# Patient Record
Sex: FEMALE | Race: BLACK OR AFRICAN AMERICAN | NOT HISPANIC OR LATINO | URBAN - METROPOLITAN AREA
[De-identification: names, ages, dates, MRNs, and addresses within clinical notes are randomized per-mention and may not be internally consistent; named-entity substitution may affect disease eponyms.]

---

## 2017-08-07 ENCOUNTER — EMERGENCY (EMERGENCY)
Facility: HOSPITAL | Age: 47
LOS: 0 days | Discharge: HOME | End: 2017-08-07

## 2017-08-07 DIAGNOSIS — Z90.710 ACQUIRED ABSENCE OF BOTH CERVIX AND UTERUS: ICD-10-CM

## 2017-08-07 DIAGNOSIS — M54.5 LOW BACK PAIN: ICD-10-CM

## 2017-12-30 ENCOUNTER — EMERGENCY (EMERGENCY)
Facility: HOSPITAL | Age: 47
LOS: 0 days | Discharge: HOME | End: 2017-12-30

## 2017-12-30 DIAGNOSIS — R05 COUGH: ICD-10-CM

## 2017-12-30 DIAGNOSIS — R50.9 FEVER, UNSPECIFIED: ICD-10-CM

## 2017-12-30 DIAGNOSIS — Z90.710 ACQUIRED ABSENCE OF BOTH CERVIX AND UTERUS: ICD-10-CM

## 2018-01-19 ENCOUNTER — EMERGENCY (EMERGENCY)
Facility: HOSPITAL | Age: 48
LOS: 0 days | Discharge: HOME | End: 2018-01-19

## 2018-01-19 DIAGNOSIS — R00.2 PALPITATIONS: ICD-10-CM

## 2018-01-19 DIAGNOSIS — R42 DIZZINESS AND GIDDINESS: ICD-10-CM

## 2018-01-19 DIAGNOSIS — Z90.710 ACQUIRED ABSENCE OF BOTH CERVIX AND UTERUS: ICD-10-CM

## 2018-11-18 ENCOUNTER — EMERGENCY (EMERGENCY)
Facility: HOSPITAL | Age: 48
LOS: 0 days | Discharge: HOME | End: 2018-11-18
Admitting: PHYSICIAN ASSISTANT

## 2018-11-18 VITALS
RESPIRATION RATE: 18 BRPM | OXYGEN SATURATION: 99 % | TEMPERATURE: 97 F | HEART RATE: 82 BPM | DIASTOLIC BLOOD PRESSURE: 73 MMHG | SYSTOLIC BLOOD PRESSURE: 138 MMHG

## 2018-11-18 DIAGNOSIS — R05 COUGH: ICD-10-CM

## 2018-11-18 DIAGNOSIS — J40 BRONCHITIS, NOT SPECIFIED AS ACUTE OR CHRONIC: ICD-10-CM

## 2018-11-18 DIAGNOSIS — J02.9 ACUTE PHARYNGITIS, UNSPECIFIED: ICD-10-CM

## 2018-11-18 RX ORDER — AZITHROMYCIN 500 MG/1
1 TABLET, FILM COATED ORAL
Qty: 1 | Refills: 0
Start: 2018-11-18 | End: 2018-11-22

## 2018-11-18 RX ORDER — FLUTICASONE PROPIONATE 50 MCG
1 SPRAY, SUSPENSION NASAL
Qty: 1 | Refills: 0
Start: 2018-11-18 | End: 2018-11-24

## 2018-11-18 NOTE — ED PROVIDER NOTE - OBJECTIVE STATEMENT
49 y/o female presents to the ED c/o "I have a bad cough with congestion, fever, chills, sore throat and runny nose for a couple days." no CP/ SOB

## 2021-05-14 ENCOUNTER — NON-APPOINTMENT (OUTPATIENT)
Age: 51
End: 2021-05-14

## 2021-05-14 PROBLEM — Z00.00 ENCOUNTER FOR PREVENTIVE HEALTH EXAMINATION: Status: ACTIVE | Noted: 2021-05-14

## 2021-05-27 ENCOUNTER — APPOINTMENT (OUTPATIENT)
Dept: BREAST CENTER | Facility: CLINIC | Age: 51
End: 2021-05-27
Payer: COMMERCIAL

## 2021-05-27 VITALS
SYSTOLIC BLOOD PRESSURE: 122 MMHG | HEIGHT: 65 IN | BODY MASS INDEX: 24.49 KG/M2 | TEMPERATURE: 96.6 F | WEIGHT: 147 LBS | DIASTOLIC BLOOD PRESSURE: 87 MMHG

## 2021-05-27 DIAGNOSIS — Z78.9 OTHER SPECIFIED HEALTH STATUS: ICD-10-CM

## 2021-05-27 PROCEDURE — 99205 OFFICE O/P NEW HI 60 MIN: CPT

## 2021-05-27 PROCEDURE — 99072 ADDL SUPL MATRL&STAF TM PHE: CPT

## 2021-05-27 NOTE — REASON FOR VISIT
[Initial Evaluation] : an initial evaluation [FreeTextEntry1] : Patient had stereotactic guided right breast biopsy on 5/10/21 that resulted as right breast DCIS

## 2021-05-27 NOTE — ASSESSMENT
[FreeTextEntry1] : Patient is 50 year old female with newly diagnosed DCIS of the right breast, here to discuss her treatment options. \par \par Patient is s/p RIGHT breast stereotactic core biopsy on 5/10/21\par Right breast 12:00 with calcifications, sereotactic core biopsy:(buckle shaped metallic clip placed at the biopsy site)\par -Ductal carcinoma in situ (DCIS), intermediate to high grade, solid type, with focal necrosis and calcifications.\par ER- POSITIVE 71-80%\par IA -NEGATIVE <1%\par \par On physical exam, there are no discrete masses in either breast or axilla.  There is no nipple discharge or inversion bilaterally.  There are no skin changes bilaterally.  \par \par We had a lengthy discussion regarding her diagnosis and treatment options.  Her pathology results were reviewed.  The patient will need a bilateral breast MRI wwo contrast performed preoperatively.  This is for evaluation of extent of disease in the affected breast and to rule out disease in the contralateral breast.\par \par We discussed that there is no difference in survival rate between lumpectomy with radiation therapy versus a mastectomy.  However, the rate of local recurrence is slightly higher with lumpectomy.  The rate of recurrence with mastectomy is not zero, and is likely closer to 4-9%.  At this time, she is leaning towards undergoing breast conservation therapy with lumpectomy.  Since this is not readily palpable, it will need to be localized preoperatively with a wire placement on the day of her surgery.  We discussed that with lumpectomy for noninvasive cancer, she will not need a sentinel lymph node biopsy unless final pathology reveals invasive disease.   She agrees to right breast lumpectomy with needle localization.   \par \par The benefits and risks of the lumpectomy procedure were explained to the patient, including but not limited to bleeding, infection, seroma and/or hematoma formation, pain, numbness of skin, scarring, and possible re-operation if the surgical excision demonstrates positive margins.  Informed consent was obtained today.  She is aware that she will meet with the pre-surgical department here at the hospital for pre-surgery testing.  She is also aware that she will likely need to meet with her primary care physician, and/or other medical specialists to obtain clearance for surgery, and to contact them appropriately.  \par \par Since the patient prefers lumpectomy, she will most likely need adjuvant radiation therapy.  The indication for radiation therapy and side effects were discussed.  Radiation therapy options consist of whole breast radiation, 5-day partial breast radiation therapy via the DANN catheter. Common side effects were reviewed.  She will be referred to radiation therapy to discuss her options.  \par \par With regard to systemic therapy, chemotherapy would not be indicated for the treatment of DCIS.  Hormonal therapy with an aromatase inhibitor or Tamoxifen, may be advised if the disease is estrogen receptor positive.  Not only can it help prevent breast cancer recurrence, it can help reduce the risk of developing a new primary tumor.  \par \par PLAN:\par 1. Bilateral Breast MRI wwo contrast.\par 2. Lumpectomy of right breast with needle localization.\par 3. Patient is a candidate for DANN; Referral to radiation oncology will be made post operatively.\par 4. Patient will be referred to Medical Oncology post operatively.\par \par I spent a total of 60 minutes of face to face time with this patient, greater than 50% of which was spent in counseling and/or coordination of care. All of her questions were appropriately answered\par \par \par \par

## 2021-05-27 NOTE — PAST MEDICAL HISTORY
[Surgical Menopause] : The patient is in surgical menopause [Total Preg ___] : G[unfilled] [Live Births ___] : P[unfilled]  [Age At Live Birth ___] : Age at live birth: [unfilled] [FreeTextEntry5] : partial hysterectomy 2016 [FreeTextEntry7] : yes, stopped in 1997 [FreeTextEntry8] : yes for 6 months

## 2021-05-27 NOTE — HISTORY OF PRESENT ILLNESS
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 Patient had left breast biopsy 2009 which was benign. She denies any palpable masses, skin changes, or nipple discharge. \par \par Mammogram on 4/16/21:\par IMPRESSION: Indeterminate calcifications in the right breast. Stereotactically guided core biopsy is advised for further evaluation.\par Biopsy of the right breast is recommended.\par BI-RAD 4: SUSPICIOUS\par \par \par RIGHT breast stereotactic core biopsy on 5/10/21\par Right breast 12:00 with calcifications, sereotactic core biopsy: (buckle shaped metallic clip placed at the biopsy site)\par -Ductal carcinoma in situ (DCIS), intermediate to high grade, solid type, with focal necrosis and calcifications.\par ER- POSITIVE 71-80%\par NH -NEGATIVE <1%\par \par No family hx of breast cancer or ovarian cancer.

## 2021-05-27 NOTE — DATA REVIEWED
[FreeTextEntry1] : \par Mammogram on 4/16/21:\par IMPRESSION: Indeterminate calcifications in the right breast. Stereotactically guided core biopsy is advised for further evaluation.\par Biopsy of the right breast is recommended.\par BI-RAD 4: SUSPICIOUS\par RIGHT breast stereotactic core biopsy on 5/10/21\par \par Right breast 12:00 with calcifications, sereotactic core biopsy:\par -Ductal carcinoma in situ (DCIS), intermediate to high grade, solid type, with focal necrosis and calcifications.\par  \par

## 2021-05-27 NOTE — PHYSICAL EXAM
[Normocephalic] : normocephalic [Atraumatic] : atraumatic [EOMI] : extra ocular movement intact [Supple] : supple [Clear to Auscultation Bilat] : clear to auscultation bilaterally [Normal Sinus Rhythm] : normal sinus rhythm [Examined in the supine and seated position] : examined in the supine and seated position [Symmetrical] : symmetrical [No dominant masses] : no dominant masses in right breast  [No dominant masses] : no dominant masses left breast [No Nipple Retraction] : no left nipple retraction [No Nipple Discharge] : no left nipple discharge [No Axillary Lymphadenopathy] : no left axillary lymphadenopathy [Soft] : abdomen soft [No Edema] : no edema [No Swelling] : no swelling [No Rashes] : no rashes [No Ulceration] : no ulceration

## 2021-05-28 ENCOUNTER — NON-APPOINTMENT (OUTPATIENT)
Age: 51
End: 2021-05-28

## 2021-06-04 ENCOUNTER — LABORATORY RESULT (OUTPATIENT)
Age: 51
End: 2021-06-04

## 2021-06-09 ENCOUNTER — OUTPATIENT (OUTPATIENT)
Dept: OUTPATIENT SERVICES | Facility: HOSPITAL | Age: 51
LOS: 1 days | Discharge: HOME | End: 2021-06-09

## 2021-06-09 DIAGNOSIS — Z02.9 ENCOUNTER FOR ADMINISTRATIVE EXAMINATIONS, UNSPECIFIED: ICD-10-CM

## 2021-06-10 ENCOUNTER — NON-APPOINTMENT (OUTPATIENT)
Age: 51
End: 2021-06-10

## 2021-06-11 ENCOUNTER — RESULT REVIEW (OUTPATIENT)
Age: 51
End: 2021-06-11

## 2021-06-11 ENCOUNTER — NON-APPOINTMENT (OUTPATIENT)
Age: 51
End: 2021-06-11

## 2021-06-11 ENCOUNTER — OUTPATIENT (OUTPATIENT)
Dept: OUTPATIENT SERVICES | Facility: HOSPITAL | Age: 51
LOS: 1 days | Discharge: HOME | End: 2021-06-11
Payer: COMMERCIAL

## 2021-06-11 DIAGNOSIS — R92.8 OTHER ABNORMAL AND INCONCLUSIVE FINDINGS ON DIAGNOSTIC IMAGING OF BREAST: ICD-10-CM

## 2021-06-11 PROCEDURE — G0279: CPT | Mod: 26

## 2021-06-11 PROCEDURE — 77066 DX MAMMO INCL CAD BI: CPT | Mod: 26

## 2021-06-15 ENCOUNTER — RESULT REVIEW (OUTPATIENT)
Age: 51
End: 2021-06-15

## 2021-06-15 ENCOUNTER — NON-APPOINTMENT (OUTPATIENT)
Age: 51
End: 2021-06-15

## 2021-06-15 ENCOUNTER — OUTPATIENT (OUTPATIENT)
Dept: OUTPATIENT SERVICES | Facility: HOSPITAL | Age: 51
LOS: 1 days | Discharge: HOME | End: 2021-06-15
Payer: COMMERCIAL

## 2021-06-15 VITALS
HEART RATE: 74 BPM | RESPIRATION RATE: 18 BRPM | OXYGEN SATURATION: 99 % | DIASTOLIC BLOOD PRESSURE: 82 MMHG | SYSTOLIC BLOOD PRESSURE: 161 MMHG | HEIGHT: 65.5 IN | TEMPERATURE: 96 F | WEIGHT: 145.06 LBS

## 2021-06-15 DIAGNOSIS — Z01.818 ENCOUNTER FOR OTHER PREPROCEDURAL EXAMINATION: ICD-10-CM

## 2021-06-15 DIAGNOSIS — Z90.711 ACQUIRED ABSENCE OF UTERUS WITH REMAINING CERVICAL STUMP: Chronic | ICD-10-CM

## 2021-06-15 DIAGNOSIS — D05.10 INTRADUCTAL CARCINOMA IN SITU OF UNSPECIFIED BREAST: ICD-10-CM

## 2021-06-15 LAB
ALBUMIN SERPL ELPH-MCNC: 4.5 G/DL — SIGNIFICANT CHANGE UP (ref 3.5–5.2)
ALP SERPL-CCNC: 80 U/L — SIGNIFICANT CHANGE UP (ref 30–115)
ALT FLD-CCNC: 20 U/L — SIGNIFICANT CHANGE UP (ref 0–41)
ANION GAP SERPL CALC-SCNC: 8 MMOL/L — SIGNIFICANT CHANGE UP (ref 7–14)
APTT BLD: 30.8 SEC — SIGNIFICANT CHANGE UP (ref 27–39.2)
AST SERPL-CCNC: 21 U/L — SIGNIFICANT CHANGE UP (ref 0–41)
BASOPHILS # BLD AUTO: 0.03 K/UL — SIGNIFICANT CHANGE UP (ref 0–0.2)
BASOPHILS NFR BLD AUTO: 0.5 % — SIGNIFICANT CHANGE UP (ref 0–1)
BILIRUB SERPL-MCNC: <0.2 MG/DL — SIGNIFICANT CHANGE UP (ref 0.2–1.2)
BUN SERPL-MCNC: 14 MG/DL — SIGNIFICANT CHANGE UP (ref 10–20)
CALCIUM SERPL-MCNC: 9.6 MG/DL — SIGNIFICANT CHANGE UP (ref 8.5–10.1)
CHLORIDE SERPL-SCNC: 102 MMOL/L — SIGNIFICANT CHANGE UP (ref 98–110)
CO2 SERPL-SCNC: 27 MMOL/L — SIGNIFICANT CHANGE UP (ref 17–32)
CREAT SERPL-MCNC: 0.8 MG/DL — SIGNIFICANT CHANGE UP (ref 0.7–1.5)
EOSINOPHIL # BLD AUTO: 0.18 K/UL — SIGNIFICANT CHANGE UP (ref 0–0.7)
EOSINOPHIL NFR BLD AUTO: 3.1 % — SIGNIFICANT CHANGE UP (ref 0–8)
GLUCOSE SERPL-MCNC: 85 MG/DL — SIGNIFICANT CHANGE UP (ref 70–99)
HCT VFR BLD CALC: 39.2 % — SIGNIFICANT CHANGE UP (ref 37–47)
HGB BLD-MCNC: 12.1 G/DL — SIGNIFICANT CHANGE UP (ref 12–16)
IMM GRANULOCYTES NFR BLD AUTO: 0 % — LOW (ref 0.1–0.3)
INR BLD: 1 RATIO — SIGNIFICANT CHANGE UP (ref 0.65–1.3)
LYMPHOCYTES # BLD AUTO: 2.49 K/UL — SIGNIFICANT CHANGE UP (ref 1.2–3.4)
LYMPHOCYTES # BLD AUTO: 42.8 % — SIGNIFICANT CHANGE UP (ref 20.5–51.1)
MCHC RBC-ENTMCNC: 25.2 PG — LOW (ref 27–31)
MCHC RBC-ENTMCNC: 30.9 G/DL — LOW (ref 32–37)
MCV RBC AUTO: 81.7 FL — SIGNIFICANT CHANGE UP (ref 81–99)
MONOCYTES # BLD AUTO: 0.41 K/UL — SIGNIFICANT CHANGE UP (ref 0.1–0.6)
MONOCYTES NFR BLD AUTO: 7 % — SIGNIFICANT CHANGE UP (ref 1.7–9.3)
NEUTROPHILS # BLD AUTO: 2.71 K/UL — SIGNIFICANT CHANGE UP (ref 1.4–6.5)
NEUTROPHILS NFR BLD AUTO: 46.6 % — SIGNIFICANT CHANGE UP (ref 42.2–75.2)
NRBC # BLD: 0 /100 WBCS — SIGNIFICANT CHANGE UP (ref 0–0)
PLATELET # BLD AUTO: 245 K/UL — SIGNIFICANT CHANGE UP (ref 130–400)
POTASSIUM SERPL-MCNC: 4.3 MMOL/L — SIGNIFICANT CHANGE UP (ref 3.5–5)
POTASSIUM SERPL-SCNC: 4.3 MMOL/L — SIGNIFICANT CHANGE UP (ref 3.5–5)
PROT SERPL-MCNC: 7.2 G/DL — SIGNIFICANT CHANGE UP (ref 6–8)
PROTHROM AB SERPL-ACNC: 11.5 SEC — SIGNIFICANT CHANGE UP (ref 9.95–12.87)
RBC # BLD: 4.8 M/UL — SIGNIFICANT CHANGE UP (ref 4.2–5.4)
RBC # FLD: 13 % — SIGNIFICANT CHANGE UP (ref 11.5–14.5)
SODIUM SERPL-SCNC: 137 MMOL/L — SIGNIFICANT CHANGE UP (ref 135–146)
WBC # BLD: 5.82 K/UL — SIGNIFICANT CHANGE UP (ref 4.8–10.8)
WBC # FLD AUTO: 5.82 K/UL — SIGNIFICANT CHANGE UP (ref 4.8–10.8)

## 2021-06-15 PROCEDURE — 93010 ELECTROCARDIOGRAM REPORT: CPT

## 2021-06-15 PROCEDURE — 71046 X-RAY EXAM CHEST 2 VIEWS: CPT | Mod: 26

## 2021-06-15 NOTE — H&P PST ADULT - NSANTHOSAYNRD_GEN_A_CORE
No. ERASTO screening performed.  STOP BANG Legend: 0-2 = LOW Risk; 3-4 = INTERMEDIATE Risk; 5-8 = HIGH Risk

## 2021-06-15 NOTE — H&P PST ADULT - NSICDXPASTMEDICALHX_GEN_ALL_CORE_FT
PAST MEDICAL HISTORY:  DCIS (ductal carcinoma in situ) right    No pertinent past medical history

## 2021-06-15 NOTE — H&P PST ADULT - HISTORY OF PRESENT ILLNESS
pt with abnormal mammo 3/2021 followed by ultrasound and biopsy   now for planned procedure       PATIENT CURRENTLY DENIES CHEST PAIN  SHORTNESS OF BREATH  PALPITATIONS,  DYSURIA, OR UPPER RESPIRATORY INFECTION IN PAST 2 WEEKS  EXERCISE  TOLERANCE  1-2 FLIGHT OF STAIRS  WITHOUT SHORTNESS OF BREATH  denies travel outside the [place] in the past 30 days  + 2 doses  moderna  Patient denies any signs or symptoms of COVID 19 and denies contact with known positive individuals.  They have an appointment for repeat COVID testing pre-procedure and acknowledge its time and place.  They were instructed to quarantine pre-procedure, practice exposure control measures, continue to self-monitor and report any concerns to their proceduralist.  pt advised self quarantine till day of procedure  Anesthesia Alert  NO--Difficult Airway  NO--History of neck surgery or radiation  NO--Limited ROM of neck  NO--History of Malignant hyperthermia  NO--No personal or family history of Pseudocholinesterase deficiency.  NO--Prior Anesthesia Complication  NO--Latex Allergy  NO--Loose teeth  NO--History of Rheumatoid Arthritis  NO--Bleeding risk  NO--ERASTO  NO--Other_____    PT DENIES ANY RASHES, ABRASION, OR OPEN WOUNDS OR CUTS    AS PER THE PT, THIS IS HIS/HER COMPLETE MEDICAL AND SURGICAL HX, INCLUDING MEDICATIONS PRESCRIBED AND OVER THE COUNTER    Patient verbalized understanding of instructions and was given the opportunity to ask questions and have them answered.      D05.10/58819    Ductal carcinoma in situ (DCIS) of breast    Encounter for other preprocedural examination    ^D05.10/78474    Ductal carcinoma in situ (DCIS) of breast    Encounter for other preprocedural examination    No pertinent past medical history

## 2021-06-15 NOTE — H&P PST ADULT - REASON FOR ADMISSION
Patient is a  51 year old  female presenting to PAST in preparation for  right breast lumpectomy with needle localization  on   7/2/2021 under [facility] anesthesia by Dr. santana yes

## 2021-06-17 ENCOUNTER — RESULT REVIEW (OUTPATIENT)
Age: 51
End: 2021-06-17

## 2021-06-17 ENCOUNTER — OUTPATIENT (OUTPATIENT)
Dept: OUTPATIENT SERVICES | Facility: HOSPITAL | Age: 51
LOS: 1 days | Discharge: HOME | End: 2021-06-17
Payer: COMMERCIAL

## 2021-06-17 DIAGNOSIS — D05.10 INTRADUCTAL CARCINOMA IN SITU OF UNSPECIFIED BREAST: ICD-10-CM

## 2021-06-17 DIAGNOSIS — Z90.711 ACQUIRED ABSENCE OF UTERUS WITH REMAINING CERVICAL STUMP: Chronic | ICD-10-CM

## 2021-06-17 PROCEDURE — 77049 MRI BREAST C-+ W/CAD BI: CPT | Mod: 26

## 2021-06-18 ENCOUNTER — NON-APPOINTMENT (OUTPATIENT)
Age: 51
End: 2021-06-18

## 2021-06-21 ENCOUNTER — RESULT REVIEW (OUTPATIENT)
Age: 51
End: 2021-06-21

## 2021-06-22 ENCOUNTER — OUTPATIENT (OUTPATIENT)
Dept: OUTPATIENT SERVICES | Facility: HOSPITAL | Age: 51
LOS: 1 days | Discharge: HOME | End: 2021-06-22
Payer: COMMERCIAL

## 2021-06-22 ENCOUNTER — RESULT REVIEW (OUTPATIENT)
Age: 51
End: 2021-06-22

## 2021-06-22 DIAGNOSIS — Z90.711 ACQUIRED ABSENCE OF UTERUS WITH REMAINING CERVICAL STUMP: Chronic | ICD-10-CM

## 2021-06-22 PROCEDURE — 19081 BX BREAST 1ST LESION STRTCTC: CPT | Mod: RT

## 2021-06-22 PROCEDURE — 88305 TISSUE EXAM BY PATHOLOGIST: CPT | Mod: 26

## 2021-06-22 PROCEDURE — 19082 BX BREAST ADD LESION STRTCTC: CPT | Mod: LT

## 2021-06-22 PROCEDURE — 76098 X-RAY EXAM SURGICAL SPECIMEN: CPT | Mod: 26

## 2021-06-23 LAB — SURGICAL PATHOLOGY STUDY: SIGNIFICANT CHANGE UP

## 2021-06-24 ENCOUNTER — NON-APPOINTMENT (OUTPATIENT)
Age: 51
End: 2021-06-24

## 2021-06-25 ENCOUNTER — NON-APPOINTMENT (OUTPATIENT)
Age: 51
End: 2021-06-25

## 2021-06-25 DIAGNOSIS — R92.1 MAMMOGRAPHIC CALCIFICATION FOUND ON DIAGNOSTIC IMAGING OF BREAST: ICD-10-CM

## 2021-06-25 DIAGNOSIS — N60.82 OTHER BENIGN MAMMARY DYSPLASIAS OF LEFT BREAST: ICD-10-CM

## 2021-06-25 DIAGNOSIS — D24.1 BENIGN NEOPLASM OF RIGHT BREAST: ICD-10-CM

## 2021-06-30 ENCOUNTER — RESULT REVIEW (OUTPATIENT)
Age: 51
End: 2021-06-30

## 2021-06-30 ENCOUNTER — OUTPATIENT (OUTPATIENT)
Dept: OUTPATIENT SERVICES | Facility: HOSPITAL | Age: 51
LOS: 1 days | Discharge: HOME | End: 2021-06-30
Payer: COMMERCIAL

## 2021-06-30 DIAGNOSIS — R92.8 OTHER ABNORMAL AND INCONCLUSIVE FINDINGS ON DIAGNOSTIC IMAGING OF BREAST: ICD-10-CM

## 2021-06-30 DIAGNOSIS — Z90.711 ACQUIRED ABSENCE OF UTERUS WITH REMAINING CERVICAL STUMP: Chronic | ICD-10-CM

## 2021-06-30 PROCEDURE — 77066 DX MAMMO INCL CAD BI: CPT | Mod: 26

## 2021-06-30 PROCEDURE — 88305 TISSUE EXAM BY PATHOLOGIST: CPT | Mod: 26

## 2021-06-30 PROCEDURE — 19085 BX BREAST 1ST LESION MR IMAG: CPT | Mod: LT

## 2021-06-30 PROCEDURE — 19085 BX BREAST 1ST LESION MR IMAG: CPT | Mod: RT

## 2021-07-01 LAB — SURGICAL PATHOLOGY STUDY: SIGNIFICANT CHANGE UP

## 2021-07-02 ENCOUNTER — NON-APPOINTMENT (OUTPATIENT)
Age: 51
End: 2021-07-02

## 2021-07-06 ENCOUNTER — NON-APPOINTMENT (OUTPATIENT)
Age: 51
End: 2021-07-06

## 2021-07-11 ENCOUNTER — LABORATORY RESULT (OUTPATIENT)
Age: 51
End: 2021-07-11

## 2021-07-11 ENCOUNTER — OUTPATIENT (OUTPATIENT)
Dept: OUTPATIENT SERVICES | Facility: HOSPITAL | Age: 51
LOS: 1 days | Discharge: HOME | End: 2021-07-11

## 2021-07-11 DIAGNOSIS — Z11.59 ENCOUNTER FOR SCREENING FOR OTHER VIRAL DISEASES: ICD-10-CM

## 2021-07-11 DIAGNOSIS — Z90.711 ACQUIRED ABSENCE OF UTERUS WITH REMAINING CERVICAL STUMP: Chronic | ICD-10-CM

## 2021-07-12 ENCOUNTER — NON-APPOINTMENT (OUTPATIENT)
Age: 51
End: 2021-07-12

## 2021-07-14 ENCOUNTER — RESULT REVIEW (OUTPATIENT)
Age: 51
End: 2021-07-14

## 2021-07-14 ENCOUNTER — OUTPATIENT (OUTPATIENT)
Dept: OUTPATIENT SERVICES | Facility: HOSPITAL | Age: 51
LOS: 1 days | Discharge: HOME | End: 2021-07-14
Payer: COMMERCIAL

## 2021-07-14 ENCOUNTER — APPOINTMENT (OUTPATIENT)
Dept: BREAST CENTER | Facility: AMBULATORY SURGERY CENTER | Age: 51
End: 2021-07-14
Payer: COMMERCIAL

## 2021-07-14 VITALS
DIASTOLIC BLOOD PRESSURE: 76 MMHG | SYSTOLIC BLOOD PRESSURE: 143 MMHG | RESPIRATION RATE: 18 BRPM | TEMPERATURE: 98 F | HEIGHT: 65.5 IN | WEIGHT: 145.06 LBS | HEART RATE: 78 BPM | OXYGEN SATURATION: 100 %

## 2021-07-14 VITALS
RESPIRATION RATE: 15 BRPM | HEART RATE: 67 BPM | SYSTOLIC BLOOD PRESSURE: 148 MMHG | OXYGEN SATURATION: 100 % | DIASTOLIC BLOOD PRESSURE: 70 MMHG

## 2021-07-14 DIAGNOSIS — Z17.0 ESTROGEN RECEPTOR POSITIVE STATUS [ER+]: ICD-10-CM

## 2021-07-14 DIAGNOSIS — Z90.710 ACQUIRED ABSENCE OF BOTH CERVIX AND UTERUS: ICD-10-CM

## 2021-07-14 DIAGNOSIS — N60.01 SOLITARY CYST OF RIGHT BREAST: ICD-10-CM

## 2021-07-14 DIAGNOSIS — N64.89 OTHER SPECIFIED DISORDERS OF BREAST: ICD-10-CM

## 2021-07-14 DIAGNOSIS — N60.21 FIBROADENOSIS OF RIGHT BREAST: ICD-10-CM

## 2021-07-14 DIAGNOSIS — D05.11 INTRADUCTAL CARCINOMA IN SITU OF RIGHT BREAST: ICD-10-CM

## 2021-07-14 DIAGNOSIS — L90.5 SCAR CONDITIONS AND FIBROSIS OF SKIN: ICD-10-CM

## 2021-07-14 DIAGNOSIS — N60.81 OTHER BENIGN MAMMARY DYSPLASIAS OF RIGHT BREAST: ICD-10-CM

## 2021-07-14 DIAGNOSIS — N60.31 FIBROSCLEROSIS OF RIGHT BREAST: ICD-10-CM

## 2021-07-14 DIAGNOSIS — Z90.711 ACQUIRED ABSENCE OF UTERUS WITH REMAINING CERVICAL STUMP: Chronic | ICD-10-CM

## 2021-07-14 PROCEDURE — 19281 PERQ DEVICE BREAST 1ST IMAG: CPT | Mod: RT

## 2021-07-14 PROCEDURE — 88305 TISSUE EXAM BY PATHOLOGIST: CPT | Mod: 26

## 2021-07-14 PROCEDURE — 19301 PARTIAL MASTECTOMY: CPT | Mod: RT

## 2021-07-14 PROCEDURE — 88304 TISSUE EXAM BY PATHOLOGIST: CPT | Mod: 26

## 2021-07-14 RX ORDER — ONDANSETRON 8 MG/1
4 TABLET, FILM COATED ORAL ONCE
Refills: 0 | Status: DISCONTINUED | OUTPATIENT
Start: 2021-07-14 | End: 2021-07-28

## 2021-07-14 RX ORDER — SODIUM CHLORIDE 9 MG/ML
1000 INJECTION, SOLUTION INTRAVENOUS
Refills: 0 | Status: DISCONTINUED | OUTPATIENT
Start: 2021-07-14 | End: 2021-07-28

## 2021-07-14 RX ORDER — OXYCODONE AND ACETAMINOPHEN 5; 325 MG/1; MG/1
1 TABLET ORAL EVERY 4 HOURS
Refills: 0 | Status: DISCONTINUED | OUTPATIENT
Start: 2021-07-14 | End: 2021-07-14

## 2021-07-14 RX ORDER — HYDROMORPHONE HYDROCHLORIDE 2 MG/ML
0.5 INJECTION INTRAMUSCULAR; INTRAVENOUS; SUBCUTANEOUS
Refills: 0 | Status: DISCONTINUED | OUTPATIENT
Start: 2021-07-14 | End: 2021-07-14

## 2021-07-14 RX ADMIN — SODIUM CHLORIDE 100 MILLILITER(S): 9 INJECTION, SOLUTION INTRAVENOUS at 11:35

## 2021-07-14 NOTE — ASU DISCHARGE PLAN (ADULT/PEDIATRIC) - CARE PROVIDER_API CALL
Rachel Barba)  Surgery  [address] [facility], [address]nd [place] [address]  Phone: [phone]  Fax: [phone]  Follow Up Time:

## 2021-07-14 NOTE — BRIEF OPERATIVE NOTE - NSICDXBRIEFPROCEDURE_GEN_ALL_CORE_FT
PROCEDURES:  Lumpectomy of right breast after needle localization 14-Jul-2021 11:22:44  Rachel Barba

## 2021-07-14 NOTE — CHART NOTE - NSCHARTNOTEFT_GEN_A_CORE
PACU ANESTHESIA ADMISSION NOTE      Procedure: Lumpectomy of right breast after needle localization      Post op diagnosis:      ____  Intubated  TV:______       Rate: ______      FiO2: ______    _x___  Patent Airway    _x___  Full return of protective reflexes    _x___  Full recovery from anesthesia / back to baseline status    Vitals:    See anesthesia record      Mental Status:  _x___ Awake   _____ Alert   _____ Drowsy   _____ Sedated    Nausea/Vomiting:  _x___  NO       ______Yes,   See Post - Op Orders         Pain Scale (0-10):  __0___    Treatment: _x___ None    ____ See Post - Op/PCA Orders    Post - Operative Fluids:   __x__ Oral   ____ See Post - Op Orders    Plan: Discharge:   _x___Home       _____Floor     _____Critical Care    _____  Other:_________________    Comments:  No anesthesia issues or complications noted.  Discharge when criteria met.

## 2021-07-21 LAB — SURGICAL PATHOLOGY STUDY: SIGNIFICANT CHANGE UP

## 2021-07-22 ENCOUNTER — APPOINTMENT (OUTPATIENT)
Dept: BREAST CENTER | Facility: CLINIC | Age: 51
End: 2021-07-22
Payer: COMMERCIAL

## 2021-07-22 VITALS
SYSTOLIC BLOOD PRESSURE: 122 MMHG | TEMPERATURE: 97.8 F | BODY MASS INDEX: 23.99 KG/M2 | WEIGHT: 144 LBS | DIASTOLIC BLOOD PRESSURE: 80 MMHG | HEIGHT: 65 IN

## 2021-07-22 PROCEDURE — 99024 POSTOP FOLLOW-UP VISIT: CPT

## 2021-07-22 NOTE — PHYSICAL EXAM
[Normocephalic] : normocephalic [EOMI] : extra ocular movement intact [Supple] : supple [Examined in the supine and seated position] : examined in the supine and seated position [No dominant masses] : no dominant masses in right breast  [No dominant masses] : no dominant masses left breast [No Nipple Retraction] : no left nipple retraction [No Nipple Discharge] : no left nipple discharge [No Axillary Lymphadenopathy] : no left axillary lymphadenopathy [Soft] : abdomen soft [No Edema] : no edema [No Swelling] : no swelling [No Rashes] : no rashes [No Ulceration] : no ulceration [de-identified] : right breast surgical incision site: No erythema, no discharge noted on inspection. No tenderness on palpation.

## 2021-07-22 NOTE — ASSESSMENT
[FreeTextEntry1] : Patient is status post Right NLOC ON 07/14/21\par She is feeling well\par She denies any fever/chills or erythema and / or drainage related to incision area. \par Her pain is well controlled, only complains of mild tenderness of the area.\par Her sutures were removed and pathology was discussed. \par \par PLAN: Right breast mass re-excision will be scheduled \par \par \par I spent a total of 15 minutes of face to face time with this patient, greater than 50% of which was spent in counseling and/or coordination of care. All of her questions were appropriately answered. \par \par

## 2021-07-22 NOTE — DATA REVIEWED
[FreeTextEntry1] :  \par  Pathology             Final\par Final Diagnosis\par 1.  Breast, right biopsy skin scar, excision/revision:\par -  Benign skin with dermal scar.  Negative for carcinoma.\par \par 2.  Breast, right superior 12:00 axis middle third mass, needle\par localized lumpectomy:\par -  Radial sclerosing lesion (radial scar) located adjacent to a\par healing prior biopsy site.\par -  Benign fibrofatty breast tissue with proliferative type\par fibrocystic changes including usual type duct hyperplasia,\par stromal fibrosis with foci of pseudoangiomatous stromal\par hyperplasia (PASH), sclerosing adenosis, and apocrine metaplasia-\par lined cysts.\par \par 3.  Breast, right inferior margin, excision:\par -  Focal ductal carcinoma in-situ (DCIS), solid type with single\par cell necrosis and cancerization of lobules, intermediate nuclear\par grade; focally abutting (<0.2 mm from, microscopic measurement)\par the nearest new inked margin.\par -  For hormone receptor protein expression status please see the\par pathology report from the prior needle core biopsy specimen (76-\par [id number]).\par -  AJCC 8th Edition Pathologic Stage:  pTis (DCIS), pNx, pMx.\par \par 4.  Breast, right posterior margin, excision:\par -  Healing prior biopsy site changes with focal ductal carcinoma\par in-situ (DCIS), solid type with single cell necrosis and\par cancerization of lobules, intermediate nuclear grade; located 2.0\par mm from the nearest new inked margin (microscopic measurement).\par \par 5.  Breast, right lateral margin, excision:\par -  Focal ductal carcinoma in-situ (DCIS), solid type with comedo\par necrosis and calcifications, intermediate nuclear\par grade; focally abutting (<0.2 mm from, microscopic measurement)\par the nearest new inked margin.\par \par Clinical History\par Right breast lumpectomy with needle localization\par \par \par \par \par \par VERONICA SANDOVAL                         \par \par \par \par Patient is a 51 year old female with Dx of DCIS\par \par Specimen(s) Submitted\par 1     Right breast biopsy scar\par Time obtained: 10:45 am\par 2     Right breast mass\par Time obtained: 10:49 am\par 3     Right breast inferior margin\par Time obtained: 10:56 am\par 4     Right breast posterior margin\par Time obtained: 10:57 am\par 5     Right breast lateral margin\par Time obtained: 10:58 am\par \par Gross Description\par 1. The specimen is received fresh, labeled "right breast biopsy\par scar" and consists of an unoriented fragment of black skin and\par attached subcutaneous tissue, measuring 0.6 x 0.3 x 0.3 cm. The\par specimen is biscted and submitted entirely. (1 block)\par \par 2. The specimen is received fresh, labeled "right breast mass"\par and consists of a fragment of fibroadipose breast tissue,\par measuring 4 x 4 x 2 cm and weighing 15 gm. The specimen is\par oriented by suture as follows: short - superior, long - lateral\par and double anterior. The specimen is inked as follows: superior -\par blue, inferior - green, medial - orange, lateral - yellow,\par anterior - red and posterior - black. The specimen has a guide\par wire entering from the medial porterior margin. A small nodular\par area is seen 0.2 cm away from the tip of the wire, measuring 0.5\par cm in greatest dimension. The remainder of the specimen is yellow\par adipose tissue. The specimen is submitted entirely. (12 blocks)\par \par 3. The specimen is received fresh, labeled "right breast inferior\par margin" and consists of a fragment of tan yellow lobulated\par adipose tissue, weighing 2 gm and measuring 2 x 1.5 x 1.0 cm.\par The sutured new margin is inked green.  The opposite side is\par inked blue. The cut surface appears uniform yellow.  The specimen\par is submitted entirely. (3 blocks)\par \par 4. The specimen is received fresh, labeled "right breast\par posterior margin" and consists of a fragment of tan yellow\par lobulated adipose tissue, weighing 2 gm and measuring 2 x 1.5\par 1.0 cm.  The sutured new margin is inked black.  The opposite\par side is inked red. The cut surface appears uniform yellow.  The\par specimen is submitted entirely. (3 blocks)\par \par 5. The specimen is received fresh, labeled "right breast lateral\par margin" and consists of a fragment of tan yellow\par \par \par \par \par \par VERONICA SANDOVAL                         3\par \par \par \par Surgical Final Report\par \par \par \par \par lobulated adipose tissue, weighing 2 gm and measuring 2.5 x 1.5 x\par 1.0 cm.  The sutured new margin is inked yellow.  The opposite\par side is inked orange. The cut surface appears uniform yellow.\par The specimen is submitted entirely. (3 blocks)\par \par

## 2021-07-22 NOTE — REASON FOR VISIT
[Post Op: _________] : a [unfilled] post op visit [FreeTextEntry1] : s/p Right breast lumpectomy with needle localization on 7/14/21

## 2021-07-22 NOTE — HISTORY OF PRESENT ILLNESS
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 Patient had left breast biopsy 2009 which was benign. She denies any palpable masses, skin changes, or nipple discharge. \par \par Mammogram on 4/16/21:\par IMPRESSION: Indeterminate calcifications in the right breast. Stereotactically guided core biopsy is advised for further evaluation.\par Biopsy of the right breast is recommended.\par BI-RAD 4: SUSPICIOUS\par \par \par RIGHT breast stereotactic core biopsy on 5/10/21\par Right breast 12:00 with calcifications, sereotactic core biopsy: (buckle shaped metallic clip placed at the biopsy site)\par -Ductal carcinoma in situ (DCIS), intermediate to high grade, solid type, with focal necrosis and calcifications.\par ER- POSITIVE 71-80%\par OR -NEGATIVE <1%\par \par No family hx of breast cancer or ovarian cancer. \par \par Today patient is here for her first post op apt s/pRNLOC on 7/14/21. She states she \par Patient denies any redness, discharge or tenderness at the incision site. She denies fevers or chills

## 2021-07-28 ENCOUNTER — NON-APPOINTMENT (OUTPATIENT)
Age: 51
End: 2021-07-28

## 2021-08-10 ENCOUNTER — OUTPATIENT (OUTPATIENT)
Dept: OUTPATIENT SERVICES | Facility: HOSPITAL | Age: 51
LOS: 1 days | Discharge: HOME | End: 2021-08-10

## 2021-08-10 ENCOUNTER — LABORATORY RESULT (OUTPATIENT)
Age: 51
End: 2021-08-10

## 2021-08-10 DIAGNOSIS — Z11.59 ENCOUNTER FOR SCREENING FOR OTHER VIRAL DISEASES: ICD-10-CM

## 2021-08-10 DIAGNOSIS — Z90.711 ACQUIRED ABSENCE OF UTERUS WITH REMAINING CERVICAL STUMP: Chronic | ICD-10-CM

## 2021-08-12 NOTE — ASU PATIENT PROFILE, ADULT - NSICDXPASTSURGICALHX_GEN_ALL_CORE_FT
PAST SURGICAL HISTORY:  H/O left breast biopsy     S/P left breast biopsy     S/P partial hysterectomy

## 2021-08-12 NOTE — ASU PATIENT PROFILE, ADULT - NSICDXPASTMEDICALHX_GEN_ALL_CORE_FT
PAST MEDICAL HISTORY:  DCIS (ductal carcinoma in situ) right    Intermittent hypertension     No pertinent past medical history

## 2021-08-13 ENCOUNTER — APPOINTMENT (OUTPATIENT)
Dept: BREAST CENTER | Facility: AMBULATORY SURGERY CENTER | Age: 51
End: 2021-08-13
Payer: COMMERCIAL

## 2021-08-13 ENCOUNTER — RESULT REVIEW (OUTPATIENT)
Age: 51
End: 2021-08-13

## 2021-08-13 ENCOUNTER — OUTPATIENT (OUTPATIENT)
Dept: OUTPATIENT SERVICES | Facility: HOSPITAL | Age: 51
LOS: 1 days | Discharge: HOME | End: 2021-08-13
Payer: COMMERCIAL

## 2021-08-13 VITALS
RESPIRATION RATE: 23 BRPM | DIASTOLIC BLOOD PRESSURE: 82 MMHG | TEMPERATURE: 98 F | HEART RATE: 79 BPM | OXYGEN SATURATION: 100 % | SYSTOLIC BLOOD PRESSURE: 138 MMHG

## 2021-08-13 VITALS
RESPIRATION RATE: 18 BRPM | SYSTOLIC BLOOD PRESSURE: 132 MMHG | DIASTOLIC BLOOD PRESSURE: 75 MMHG | OXYGEN SATURATION: 100 % | WEIGHT: 145.06 LBS | TEMPERATURE: 98 F | HEART RATE: 74 BPM | HEIGHT: 65.5 IN

## 2021-08-13 DIAGNOSIS — Z98.890 OTHER SPECIFIED POSTPROCEDURAL STATES: Chronic | ICD-10-CM

## 2021-08-13 DIAGNOSIS — Z90.711 ACQUIRED ABSENCE OF UTERUS WITH REMAINING CERVICAL STUMP: Chronic | ICD-10-CM

## 2021-08-13 PROCEDURE — 88305 TISSUE EXAM BY PATHOLOGIST: CPT | Mod: 26

## 2021-08-13 PROCEDURE — 19301 PARTIAL MASTECTOMY: CPT | Mod: RT,58

## 2021-08-13 PROCEDURE — 88304 TISSUE EXAM BY PATHOLOGIST: CPT | Mod: 26

## 2021-08-13 RX ORDER — ONDANSETRON 8 MG/1
4 TABLET, FILM COATED ORAL ONCE
Refills: 0 | Status: DISCONTINUED | OUTPATIENT
Start: 2021-08-13 | End: 2021-08-27

## 2021-08-13 RX ORDER — ACETAMINOPHEN 500 MG
650 TABLET ORAL ONCE
Refills: 0 | Status: DISCONTINUED | OUTPATIENT
Start: 2021-08-13 | End: 2021-08-27

## 2021-08-13 RX ORDER — SODIUM CHLORIDE 9 MG/ML
1000 INJECTION, SOLUTION INTRAVENOUS
Refills: 0 | Status: DISCONTINUED | OUTPATIENT
Start: 2021-08-13 | End: 2021-08-27

## 2021-08-13 RX ORDER — MEPERIDINE HYDROCHLORIDE 50 MG/ML
12.5 INJECTION INTRAMUSCULAR; INTRAVENOUS; SUBCUTANEOUS ONCE
Refills: 0 | Status: DISCONTINUED | OUTPATIENT
Start: 2021-08-13 | End: 2021-08-13

## 2021-08-13 RX ORDER — HYDROMORPHONE HYDROCHLORIDE 2 MG/ML
1 INJECTION INTRAMUSCULAR; INTRAVENOUS; SUBCUTANEOUS
Refills: 0 | Status: DISCONTINUED | OUTPATIENT
Start: 2021-08-13 | End: 2021-08-13

## 2021-08-13 RX ORDER — OXYCODONE HYDROCHLORIDE 5 MG/1
5 TABLET ORAL ONCE
Refills: 0 | Status: DISCONTINUED | OUTPATIENT
Start: 2021-08-13 | End: 2021-08-13

## 2021-08-13 RX ORDER — HYDROMORPHONE HYDROCHLORIDE 2 MG/ML
0.5 INJECTION INTRAMUSCULAR; INTRAVENOUS; SUBCUTANEOUS
Refills: 0 | Status: DISCONTINUED | OUTPATIENT
Start: 2021-08-13 | End: 2021-08-13

## 2021-08-13 RX ADMIN — SODIUM CHLORIDE 100 MILLILITER(S): 9 INJECTION, SOLUTION INTRAVENOUS at 10:15

## 2021-08-13 NOTE — PRE-ANESTHESIA EVALUATION ADULT - NSANTHADDINFOFT_GEN_ALL_CORE
risks, benefits, alternatives, general anesthesia discussed with the patient and she agrees to proceed as planned

## 2021-08-13 NOTE — CHART NOTE - NSCHARTNOTEFT_GEN_A_CORE
PACU ANESTHESIA ADMISSION NOTE      Procedure: Re-excision of lesion of breast      Post op diagnosis:      ____  Intubated  TV:______       Rate: ______      FiO2: ______    _x___  Patent Airway    x____  Full return of protective reflexes    ___x_  Full recovery from anesthesia / back to baseline     Vitals:   T: 98.2          R: 14                 BP:   112/65               Sat:  98%                 P: 73      Mental Status:  __x__ Awake   __x___ Alert   _____ Drowsy   _____ Sedated    Nausea/Vomiting:  __x__ NO  ______Yes,   See Post - Op Orders          Pain Scale (0-10):  __0___    Treatment: ____ None    ____ See Post - Op/PCA Orders    Post - Operative Fluids:   ____ Oral   ____ See Post - Op Orders    Plan: Discharge:   __x__Home       _____Floor     _____Critical Care    _____  Other:_________________    Comments: Pt awake and alert. Vital signs stable. Discharge when appropriate.

## 2021-08-16 LAB — SURGICAL PATHOLOGY STUDY: SIGNIFICANT CHANGE UP

## 2021-08-18 DIAGNOSIS — C50.911 MALIGNANT NEOPLASM OF UNSPECIFIED SITE OF RIGHT FEMALE BREAST: ICD-10-CM

## 2021-08-24 ENCOUNTER — APPOINTMENT (OUTPATIENT)
Dept: BREAST CENTER | Facility: CLINIC | Age: 51
End: 2021-08-24
Payer: COMMERCIAL

## 2021-08-24 VITALS
HEIGHT: 65 IN | DIASTOLIC BLOOD PRESSURE: 74 MMHG | TEMPERATURE: 98.2 F | BODY MASS INDEX: 23.99 KG/M2 | SYSTOLIC BLOOD PRESSURE: 132 MMHG | WEIGHT: 144 LBS

## 2021-08-24 DIAGNOSIS — D05.10 INTRADUCTAL CARCINOMA IN SITU OF UNSPECIFIED BREAST: ICD-10-CM

## 2021-08-24 PROBLEM — I10 ESSENTIAL (PRIMARY) HYPERTENSION: Chronic | Status: ACTIVE | Noted: 2021-08-13

## 2021-08-24 PROCEDURE — 99024 POSTOP FOLLOW-UP VISIT: CPT

## 2021-08-24 NOTE — DATA REVIEWED
[FreeTextEntry1] : Surgical Final Report\par \par \par \par \par Final Diagnosis\par 1. Breast, right skin scar, excision/revision:\par - Benign skin with dermal scar and focal post surgical site\par changes including suture granulomata. Negative for carcinoma.\par \par 2. Breast, right inferior margin, re-excision:\par - Benign fibrofatty breast tissue with post surgical site\par changes. Negative for carcinoma.\par \par 3.  Breast, right lateral margin, re-excision:\par - Benign fibrofatty breast tissue with post surgical site\par changes. Negative\par for carcinoma.\par \par Verified by: Abdelrahman Renae M.D.\par (Electronic Signature)\par Reported on: 08/16/21 15:57 EDT, [address] [place] [address],\[place] [address]\par Phone: [phone]   Fax: [phone]\par _________________________________________________________________\par \par Clinical History\par Right breast masses re-excision\par S/P lumpectomy\par COVID negative\par \par Specimen(s) Submitted\par 1     Right breast old scar\par 2     Right breast inferior margin\par 3     Right breast lateral margin\par \par Gross Description\par 1.  The specimen is received fresh, labeled "right breast old\par scar" and consists of a single elongated fragment of scarred skin\par measuring 2.4 cm in length and 0.3 cm in diameter. The specimen\par is serially sectioned.  Representative sections are submitted. (1\par block)\par \par 2.  The specimen is received fresh, labeled "right breast\par inferior margin stitch new inferior margin" and consists of a\par single fragment of soft fibroadipose tissue weighing 2 grams and\par measuring 2 x 1.5 x 1.2 cm. The stitch new inferior margin is\par inked black and opposite margin is inked green.  The specimen is\par serially sectioned. Cut surface is soft yellow. The specimen is\par submitted entirely. (3 blocks)\par \par 3.  The specimen is received fresh, labeled "right breast lateral\par margin\par \par \par \par \par \par VERONICA SANDOVAL                         2\par \par \par \par Surgical Final Report\par \par \par \par \par stitch new lateral margin" and consists of a single fragment of\par soft yellow fibroadipose tissue weighing 2 grams and measuring 2\par x 1.5 x 1.2 cm. The stitch new lateral margin is inked black and\par opposite margin is inked green. The specimen is serially\par sectioned.  Cut surface is soft yellow. The specimen is submitted\par entirely. (3 blocks)\par \par Specimen was received and underwent gross examination at [facility], [address] [place] [address].\par \par 08/13/2021 15:36:54 EDT mk\par \par Perioperative Diagnosis\par Malignancy\par

## 2021-08-24 NOTE — REASON FOR VISIT
[Post Op: _________] : a [unfilled] post op visit [FreeTextEntry1] : right breast margin re-excision on 08/13/21

## 2021-08-24 NOTE — ASSESSMENT
[FreeTextEntry1] : Patient is status post Right NLOC ON 07/14/21 and right breast re-excision on 08/13/21\par She is feeling well\par She denies any fever/chills or erythema and / or drainage related to incision area. \par Her pain is well controlled, only complains of mild tenderness of the area.\par Her sutures were removed and pathology was discussed. \par \par PLAN: B/L DX MAMMO Feb 2022\par CBE in 6 months\par med/omc and rad/onc \par Prelude DX \par \par I spent a total of 15 minutes of face to face time with this patient, greater than 50% of which was spent in counseling and/or coordination of care. All of her questions were appropriately answered. \par \par

## 2021-08-24 NOTE — HISTORY OF PRESENT ILLNESS
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 Patient had left breast biopsy 2009 which was benign. She denies any palpable masses, skin changes, or nipple discharge. \par \par Mammogram on 4/16/21:\par IMPRESSION: Indeterminate calcifications in the right breast. Stereotactically guided core biopsy is advised for further evaluation.\par Biopsy of the right breast is recommended.\par BI-RAD 4: SUSPICIOUS\par \par \par RIGHT breast stereotactic core biopsy on 5/10/21\par Right breast 12:00 with calcifications, sereotactic core biopsy: (buckle shaped metallic clip placed at the biopsy site)\par -Ductal carcinoma in situ (DCIS), intermediate to high grade, solid type, with focal necrosis and calcifications.\par ER- POSITIVE 71-80%\par CT -NEGATIVE <1%\par \par No family hx of breast cancer or ovarian cancer. \par \par Today patient is here for her first post op apt s/pRNLOC on 7/14/21. She states she \par Patient denies any redness, discharge or tenderness at the incision site. She denies fevers or chills \par \par INTERVAL HISTORY: 0824/21\par Brittney is here today for her first post op apt for right breast margin re-excision. Patient denies any redness, discharge or tenderness at the incision site. She denies fevers or chills. She is here for her pathology review and incision care\par

## 2021-08-24 NOTE — PHYSICAL EXAM
[Normocephalic] : normocephalic [EOMI] : extra ocular movement intact [Examined in the supine and seated position] : examined in the supine and seated position [Symmetrical] : symmetrical [No dominant masses] : no dominant masses in right breast  [No dominant masses] : no dominant masses left breast [No Nipple Retraction] : no left nipple retraction [No Nipple Discharge] : no left nipple discharge [No Axillary Lymphadenopathy] : no left axillary lymphadenopathy [No Edema] : no edema [No Rashes] : no rashes [No Ulceration] : no ulceration [de-identified] : well healed incision noted on inspection. No erythema, no discharge, no swelling on inspection. No tenderness on palpation

## 2021-09-03 ENCOUNTER — NON-APPOINTMENT (OUTPATIENT)
Age: 51
End: 2021-09-03

## 2021-09-22 ENCOUNTER — APPOINTMENT (OUTPATIENT)
Dept: RADIATION ONCOLOGY | Facility: HOSPITAL | Age: 51
End: 2021-09-22
Payer: COMMERCIAL

## 2021-09-22 VITALS
TEMPERATURE: 97.5 F | DIASTOLIC BLOOD PRESSURE: 70 MMHG | BODY MASS INDEX: 23.88 KG/M2 | WEIGHT: 143.5 LBS | HEART RATE: 87 BPM | RESPIRATION RATE: 16 BRPM | OXYGEN SATURATION: 100 % | SYSTOLIC BLOOD PRESSURE: 120 MMHG

## 2021-09-22 DIAGNOSIS — Z78.9 OTHER SPECIFIED HEALTH STATUS: ICD-10-CM

## 2021-09-22 PROCEDURE — 99244 OFF/OP CNSLTJ NEW/EST MOD 40: CPT | Mod: 25

## 2021-09-22 RX ORDER — OXYCODONE AND ACETAMINOPHEN 5; 325 MG/1; MG/1
5-325 TABLET ORAL
Qty: 15 | Refills: 0 | Status: DISCONTINUED | COMMUNITY
Start: 2021-07-14 | End: 2021-09-22

## 2021-09-22 RX ORDER — CHROMIUM 200 MCG
TABLET ORAL
Refills: 0 | Status: ACTIVE | COMMUNITY

## 2021-09-22 RX ORDER — BIOTIN 10 MG
TABLET ORAL
Refills: 0 | Status: ACTIVE | COMMUNITY

## 2021-09-22 NOTE — PHYSICAL EXAM
[Sclera] : the sclera and conjunctiva were normal [Hearing Threshold Finger Rub Not [place]] : hearing was normal [Heart Rate And Rhythm] : heart rate and rhythm were normal [Murmurs] : no murmurs present [Edema] : no peripheral edema present [No Focal Deficits] : no focal deficits [Normal] : oriented to person, place and time, the affect was normal, the mood was normal and not anxious [No UE Edema] : there is no upper extremity edema [No Discharge] : no discharge [No Masses] : no breast masses were palpable [Abdomen Soft] : soft [Nondistended] : nondistended [Abdomen Tenderness] : non-tender [Cervical Lymph Nodes Enlarged Posterior Bilaterally] : posterior cervical [Cervical Lymph Nodes Enlarged Anterior Bilaterally] : anterior cervical [Supraclavicular Lymph Nodes Enlarged Bilaterally] : supraclavicular [___] : no erythema [Enlargement Of The Right Breast] : no swelling [Axillary Lymph Nodes Enlarged Bilaterally] : no enlarged nodes [de-identified] : She is examined in both the upright and supine positions.  The right breast has a healing upper central scar with mild keloid formation.   She notes mild discomfort on palpation. There is no infection.  The left breast is unremarkable. .  No palpable mass in either breast.

## 2021-09-22 NOTE — DISEASE MANAGEMENT
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

## 2021-09-22 NOTE — REVIEW OF SYSTEMS
[Patient Intake Form Reviewed] : Patient intake form was reviewed [Negative] : Allergic/Immunologic [FreeTextEntry2] : headache [FreeTextEntry4] : tinnitus, sinus problems [FreeTextEntry5] : cold hands or feet [FreeTextEntry7] : f [FreeTextEntry8] : menopause/ hormone issues [FreeTextEntry9] : arthritis [de-identified] : itching [de-identified] : frequent thirst, frequent hunger

## 2021-09-22 NOTE — PAST MEDICAL HISTORY
[Surgical Menopause] : The patient is in surgical menopause [Menarche Age ____] : age at menarche was [unfilled] [Menopause Age____] : age at menopause was [unfilled] [Total Preg ___] : G[unfilled] [Live Births ___] : P[unfilled]  [Age At Live Birth ___] : Age at live birth: [unfilled] [FreeTextEntry5] : partial hysterectomy 2016 [FreeTextEntry7] : yes, stopped in 1997 [FreeTextEntry8] : yes for 6 months

## 2021-09-22 NOTE — LETTER CLOSING
[Consult Closing:] : Thank you for allowing me to participate in the care of this patient.  If you have any questions, please do not hesitate to contact me. [Sincerely yours,] : Sincerely yours, [FreeTextEntry3] : Heather Bauer M.D. \par \par Electronically proofread and signed by:  Heather Bauer MD\par Attending, Department of Radiation Medicine\par [facility]\par \par CC: Dr. Squires

## 2021-09-22 NOTE — OB/GYN HISTORY
[Definite:  ___ (Date)] : the last menstrual period was [unfilled] [Menopause Age: ____] : patient was [unfilled] years old at menopause [___] : Living: [unfilled]

## 2021-09-22 NOTE — HISTORY OF PRESENT ILLNESS
[FreeTextEntry1] : \par The patient is a 51 year old woman who was recently noted on screening mammogram (4/13/2021) to have calcifications in the right breast at 12 o’clock.  Diagnostic mammogram and ultrasound on 4/16/2021 showed indeterminate calcifications in the right breast.  Stereotactically guided core biopsy recommended.  BI-RADS 4: Suspicious.\par \par On 5/4/2021, she had a biopsy of the right breast abnormality at 12 o’clock and pathology showed ductal carcinoma in situ (DCIS), intermediate to high nuclear grade, solid type, with focal necrosis and calcifications.  It was ER positive / UT negative.\par On 6/4/2021, two (2) outside slide for review from [facility] shows ductal carcinoma in-situ (DCIS), solid type with single cell necrosis and calcifications, intermediate nuclear grade.  It was ER positive and UT negative.  \par \par She also had an MRI of bilateral breasts on 6/17/2021, which showed indeterminate areas of abnormal enhancement in each breast. MRI guided biopsy of both breasts (1 site in each breast) is recommended.\par Biopsy-proven DCIS in the right breast.\par Recommendation: MRI guided biopsy.\par BI-RADS Category 4: Suspicious\par \par On 6/22/2021, bilateral stereotactic biopsy was performed and pathology was benign in both breasts. \par \par On 7/14/2021, She underwent a wide local excision, right breast at 12 o'clock, superior middle 1/3.  She was found to have DCIS, G2, ER positive / UT negative.  Surgical margins were very close: 0.2mm.  She had a re-excision on 8/13/2021 and there was no residual disease. \par \par Prelude Dx (7/14/2021) DCISonRT score results shows 3 (0-10 scale).\par \par She has been doing well since surgery.  She  is here to discuss radiation. \par \par Med/Onc: Dr. Squires 10/21/2021\par Sx: Dr. Barba\par Dr. Sweet 2/24/2022\par \par

## 2021-09-25 PROCEDURE — 77334 RADIATION TREATMENT AID(S): CPT | Mod: 26

## 2021-09-27 PROCEDURE — 77332 RADIATION TREATMENT AID(S): CPT | Mod: 26

## 2021-09-27 PROCEDURE — 77290 THER RAD SIMULAJ FIELD CPLX: CPT | Mod: 26

## 2021-09-28 PROCEDURE — 77263 THER RADIOLOGY TX PLNG CPLX: CPT

## 2021-10-03 PROCEDURE — 77300 RADIATION THERAPY DOSE PLAN: CPT | Mod: 26

## 2021-10-03 PROCEDURE — 77334 RADIATION TREATMENT AID(S): CPT | Mod: 26

## 2021-10-03 PROCEDURE — 77295 3-D RADIOTHERAPY PLAN: CPT | Mod: 26

## 2021-10-07 PROCEDURE — 77280 THER RAD SIMULAJ FIELD SMPL: CPT | Mod: 26

## 2021-10-07 PROCEDURE — 77334 RADIATION TREATMENT AID(S): CPT | Mod: 26

## 2021-10-12 PROCEDURE — 77387C: CUSTOM

## 2021-10-13 ENCOUNTER — NON-APPOINTMENT (OUTPATIENT)
Age: 51
End: 2021-10-13

## 2021-10-14 ENCOUNTER — NON-APPOINTMENT (OUTPATIENT)
Age: 51
End: 2021-10-14

## 2021-10-14 VITALS
WEIGHT: 143.8 LBS | HEART RATE: 84 BPM | OXYGEN SATURATION: 100 % | RESPIRATION RATE: 18 BRPM | DIASTOLIC BLOOD PRESSURE: 70 MMHG | SYSTOLIC BLOOD PRESSURE: 125 MMHG | BODY MASS INDEX: 23.96 KG/M2 | TEMPERATURE: 97.1 F | HEIGHT: 65 IN

## 2021-10-14 PROCEDURE — 77387C: CUSTOM

## 2021-10-14 NOTE — DISEASE MANAGEMENT
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

## 2021-10-15 PROCEDURE — 77427 RADIATION TX MANAGEMENT X5: CPT

## 2021-10-18 ENCOUNTER — NON-APPOINTMENT (OUTPATIENT)
Age: 51
End: 2021-10-18

## 2021-10-18 VITALS
TEMPERATURE: 97.3 F | SYSTOLIC BLOOD PRESSURE: 126 MMHG | DIASTOLIC BLOOD PRESSURE: 69 MMHG | OXYGEN SATURATION: 100 % | WEIGHT: 144 LBS | HEART RATE: 86 BPM | RESPIRATION RATE: 14 BRPM | BODY MASS INDEX: 23.96 KG/M2

## 2021-10-18 PROCEDURE — 77387C: CUSTOM

## 2021-10-18 NOTE — PHYSICAL EXAM
[Sclera] : the sclera and conjunctiva were normal [Hearing Threshold Finger Rub Not [place]] : hearing was normal [] : no respiratory distress [Respiration, Rhythm And Depth] : normal respiratory rhythm and effort [Exaggerated Use Of Accessory Muscles For Inspiration] : no accessory muscle use [Heart Rate And Rhythm] : heart rate and rhythm were normal [No Focal Deficits] : no focal deficits [Normal] : oriented to person, place and time, the affect was normal, the mood was normal and not anxious [de-identified] : mild erythema on treated breast. No desquamation

## 2021-10-18 NOTE — DISEASE MANAGEMENT
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

## 2021-10-18 NOTE — REVIEW OF SYSTEMS
[Fatigue: Grade 1 - Fatigue relieved by rest] : Fatigue: Grade 1 - Fatigue relieved by rest [Breast Pain: Grade 0] : Breast Pain: Grade 0 [Dyspnea: Grade 0] : Dyspnea: Grade 0 [Pruritus: Grade 0] : Pruritus: Grade 0 [Dermatitis Radiation: Grade 1 - Faint erythema or dry desquamation] : Dermatitis Radiation: Grade 1 - Faint erythema or dry desquamation

## 2021-10-18 NOTE — HISTORY OF PRESENT ILLNESS
[FreeTextEntry1] : Patient reports doing well.  She is moisturizing as instructed.  She denies breast pain.  No new concerns.

## 2021-10-19 PROCEDURE — 77387C: CUSTOM

## 2021-10-20 PROCEDURE — 77387C: CUSTOM

## 2021-10-21 ENCOUNTER — APPOINTMENT (OUTPATIENT)
Dept: HEMATOLOGY ONCOLOGY | Facility: CLINIC | Age: 51
End: 2021-10-21

## 2021-10-21 PROCEDURE — 77387C: CUSTOM

## 2021-10-22 PROCEDURE — 77427 RADIATION TX MANAGEMENT X5: CPT

## 2021-10-25 ENCOUNTER — NON-APPOINTMENT (OUTPATIENT)
Age: 51
End: 2021-10-25

## 2021-10-25 VITALS
TEMPERATURE: 96.6 F | SYSTOLIC BLOOD PRESSURE: 143 MMHG | DIASTOLIC BLOOD PRESSURE: 86 MMHG | WEIGHT: 143 LBS | OXYGEN SATURATION: 100 % | HEIGHT: 65 IN | HEART RATE: 76 BPM | BODY MASS INDEX: 23.82 KG/M2 | RESPIRATION RATE: 16 BRPM

## 2021-10-25 DIAGNOSIS — R53.83 OTHER FATIGUE: ICD-10-CM

## 2021-10-25 PROCEDURE — 77387C: CUSTOM

## 2021-10-26 PROCEDURE — 77387C: CUSTOM

## 2021-10-27 PROCEDURE — 77387C: CUSTOM

## 2021-10-28 PROCEDURE — 77387C: CUSTOM

## 2021-10-29 PROCEDURE — 77427 RADIATION TX MANAGEMENT X5: CPT

## 2021-10-29 NOTE — REVIEW OF SYSTEMS
[Fatigue: Grade 1 - Fatigue relieved by rest] : Fatigue: Grade 1 - Fatigue relieved by rest [Breast Pain: Grade 1 - Mild pain] : Breast Pain: Grade 1 - Mild pain [Dermatitis Radiation: Grade 1 - Faint erythema or dry desquamation] : Dermatitis Radiation: Grade 1 - Faint erythema or dry desquamation [FreeTextEntry7] : discomfort at surgical site [FreeTextEntry6] : incisional scar flattened, not as raised and looks good

## 2021-10-29 NOTE — PHYSICAL EXAM
[Sclera] : the sclera and conjunctiva were normal [] : no respiratory distress [Respiration, Rhythm And Depth] : normal respiratory rhythm and effort [Exaggerated Use Of Accessory Muscles For Inspiration] : no accessory muscle use [Heart Rate And Rhythm] : heart rate and rhythm were normal [Lt > Rt] : the left breast was larger than the right [No Discharge] : no discharge [Musculoskeletal - Swelling] : no joint swelling [Nail Clubbing] : no clubbing  or cyanosis of the fingernails [Motor Tone] : muscle strength and tone were normal [No Focal Deficits] : no focal deficits [Normal] : oriented to person, place and time, the affect was normal, the mood was normal and not anxious [___] : no erythema [Enlargement Of The Right Breast] : no swelling [Tenderness Of The Right Breast] : no tenderness

## 2021-10-29 NOTE — VITALS
[Pain Description/Quality: ___] : Pain description/quality: [unfilled] [Pain Duration: ___] : Pain duration: [unfilled] [Pain Location: ___] : Pain Location: [unfilled] [OTC] : OTC [90: Able to carry normal activity; minor signs or symptoms of disease.] : 90: Able to carry normal activity; minor signs or symptoms of disease.  [Maximal Pain Intensity: 5/10] : 5/10 [Pain Interferes with ADLs] : Pain does not interfere with activities of daily living

## 2021-10-29 NOTE — DISEASE MANAGEMENT
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

## 2021-10-29 NOTE — HISTORY OF PRESENT ILLNESS
[FreeTextEntry1] : Patient reports doing well, however she is having sharp pangs of discomfort at surgical site which resolve quickly. She states that they are a 7 out of 10. Patient says she moisturizes area with Eucerin when she remembers, but always at bedtime. She stated that she has mild fatigue that is relieved with rest.

## 2021-11-01 ENCOUNTER — OUTPATIENT (OUTPATIENT)
Dept: OUTPATIENT SERVICES | Facility: HOSPITAL | Age: 51
LOS: 1 days | Discharge: HOME | End: 2021-11-01
Payer: SELF-PAY

## 2021-11-01 DIAGNOSIS — D05.11 INTRADUCTAL CARCINOMA IN SITU OF RIGHT BREAST: ICD-10-CM

## 2021-11-01 DIAGNOSIS — Z90.711 ACQUIRED ABSENCE OF UTERUS WITH REMAINING CERVICAL STUMP: Chronic | ICD-10-CM

## 2021-11-01 DIAGNOSIS — Z98.890 OTHER SPECIFIED POSTPROCEDURAL STATES: Chronic | ICD-10-CM

## 2021-11-01 PROCEDURE — 77387C: CUSTOM

## 2021-11-05 ENCOUNTER — OUTPATIENT (OUTPATIENT)
Dept: OUTPATIENT SERVICES | Facility: HOSPITAL | Age: 51
LOS: 1 days | Discharge: HOME | End: 2021-11-05

## 2021-11-05 ENCOUNTER — APPOINTMENT (OUTPATIENT)
Dept: HEMATOLOGY ONCOLOGY | Facility: CLINIC | Age: 51
End: 2021-11-05
Payer: SELF-PAY

## 2021-11-05 DIAGNOSIS — D05.11 INTRADUCTAL CARCINOMA IN SITU OF RIGHT BREAST: ICD-10-CM

## 2021-11-05 DIAGNOSIS — Z98.890 OTHER SPECIFIED POSTPROCEDURAL STATES: Chronic | ICD-10-CM

## 2021-11-05 DIAGNOSIS — Z90.711 ACQUIRED ABSENCE OF UTERUS WITH REMAINING CERVICAL STUMP: Chronic | ICD-10-CM

## 2021-11-05 PROCEDURE — 99205 OFFICE O/P NEW HI 60 MIN: CPT | Mod: 95

## 2021-11-05 NOTE — REVIEW OF SYSTEMS
[Negative] : Allergic/Immunologic [FreeTextEntry2] : last colonoscopy was 4 years ago, UTD with GYN follow up

## 2021-11-05 NOTE — PHYSICAL EXAM
[Fully active, able to carry on all pre-disease performance without restriction] : Status 0 - Fully active, able to carry on all pre-disease performance without restriction [Normal] : well developed, well nourished, in no acute distress [de-identified] : Not in distress

## 2021-11-05 NOTE — ASSESSMENT
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 She is s/p breast conserving surgery and adjuvant RT. \par \par Pt became menopausal after hysterectomy for fibroids. Ovaries were not removed\par \par RECOMMENDATIONS: We had a detailed discussion regarding her diagnosis and therapeutic options. \par The natural history and prognosis of DCIS was discussed at length.\par She has already completed local treatment without any serious complications.\par \par She was recommended adjuvant endocrine therapy with tamoxifen 20mg daily X 5 years.\par Side effects of Tamoxifen including hot flashes, menstrual irregularities, weight gain, mood changes, DVT, cataracts  were discussed.\par \par I reviewed all labs ( nl CBC, CMP, B12, lipid profile ( elev cholesterol), imaging, pathology with her in detail.\par \par Plan for follow up including need for imaging Rt breast with mammogram Q 6 mths X 2 years was discussed.\par She was advised a healthy life style with regular exercise, healthy diet and avoidance of ETOH.\par \par RT in 3 M\par \par All of her questions and concerns were addressed in detail.\par

## 2021-11-05 NOTE — HISTORY OF PRESENT ILLNESS
[Home] : at home, [unfilled] , at the time of the visit. [Verbal consent obtained from patient] : the patient, [unfilled] [Disease: _____________________] : Disease: [unfilled] [T: ___] : T[unfilled] [N: ___] : N[unfilled] [M: ___] : M[unfilled] [AJCC Stage: ____] : AJCC Stage: [unfilled] [de-identified] : This is a very pleasant 51 year old woman who is being eval for Rt breast DCIS\par \par Work up so far\par screening mammogram (4/13/2021) :  calcifications in the right breast at 12 o’clock. Diagnostic mammogram and ultrasound on 4/16/2021 showed indeterminate calcifications in the right breast. Stereotactically guided core biopsy recommended. BI-RADS 4: Suspicious.\par \par On 5/4/2021, she had a biopsy of the right breast abnormality at 12 o’clock and pathology showed ductal carcinoma in situ (DCIS), intermediate to high nuclear grade, solid type, with focal necrosis and calcifications. It was ER positive / WA negative.\par On 6/4/2021, two (2) outside slide for review from [facility] shows ductal carcinoma in-situ (DCIS), solid type with single cell necrosis and calcifications, intermediate nuclear grade. It was ER positive and WA negative. \par \par She also had an MRI of bilateral breasts on 6/17/2021, which showed indeterminate areas of abnormal enhancement in each breast. MRI guided biopsy of both breasts (1 site in each breast) is recommended.\par Biopsy-proven DCIS in the right breast.\par Recommendation: MRI guided biopsy.\par BI-RADS Category 4: Suspicious\par \par On 6/22/2021, bilateral stereotactic biopsy was performed and pathology was benign in both breasts. \par \par On 7/14/2021, She underwent a wide local excision, right breast at 12 o'clock, superior middle 1/3. She was found to have DCIS, G2, ER positive / WA negative. Surgical margins were very close: 0.2mm. She had a re-excision on 8/13/2021 and there was no residual disease. \par \par Prelude Dx (7/14/2021) DCISonRT score results shows 3 (0-10 scale).\par \par She has completed RT on 11/1/21\par \par \par She has been doing well since surgery.    Patient denies any new palpable breast lumps or pain, denies skin changes, denies nipple discharge.  Patient denies cough, shortness of breath, denies fever, denies bone pain.\par \par Menstrual Hx: The patient is in surgical menopause. age at menarche was 16. age at menopause was 45. \par Past Gyn Surgeries: partial hysterectomy 2016. \par Prior pregnancies: G3 and P3 . Age at live birth: 19 \par Birth Control Pill Use: yes, stopped in 1997. \par Breast Feeding History: yes for 6 months \par \par \par  [de-identified] : DCIS

## 2021-11-30 ENCOUNTER — NON-APPOINTMENT (OUTPATIENT)
Age: 51
End: 2021-11-30

## 2021-12-02 ENCOUNTER — APPOINTMENT (OUTPATIENT)
Dept: RADIATION ONCOLOGY | Facility: HOSPITAL | Age: 51
End: 2021-12-02
Payer: COMMERCIAL

## 2021-12-02 ENCOUNTER — NON-APPOINTMENT (OUTPATIENT)
Age: 51
End: 2021-12-02

## 2021-12-02 VITALS
HEART RATE: 87 BPM | WEIGHT: 139 LBS | BODY MASS INDEX: 23.13 KG/M2 | RESPIRATION RATE: 16 BRPM | OXYGEN SATURATION: 100 % | DIASTOLIC BLOOD PRESSURE: 78 MMHG | SYSTOLIC BLOOD PRESSURE: 144 MMHG | TEMPERATURE: 96.8 F

## 2021-12-02 VITALS
RESPIRATION RATE: 16 BRPM | SYSTOLIC BLOOD PRESSURE: 144 MMHG | WEIGHT: 139 LBS | HEART RATE: 87 BPM | OXYGEN SATURATION: 100 % | BODY MASS INDEX: 23.13 KG/M2 | TEMPERATURE: 96.8 F | DIASTOLIC BLOOD PRESSURE: 78 MMHG

## 2021-12-02 PROCEDURE — 99024 POSTOP FOLLOW-UP VISIT: CPT

## 2021-12-02 RX ORDER — GLUC/MSM/COLGN2/HYAL/ANTIARTH3 375-375-20
TABLET ORAL
Refills: 0 | Status: DISCONTINUED | COMMUNITY
End: 2021-12-02

## 2021-12-02 NOTE — PHYSICAL EXAM
[General Appearance - Well Developed] : well developed [General Appearance - Alert] : alert [General Appearance - In No Acute Distress] : in no acute distress [Thin] : thin [Sclera] : the sclera and conjunctiva were normal [Hearing Threshold Finger Rub Not [place]] : hearing was normal [Heart Rate And Rhythm] : heart rate and rhythm were normal [Heart Sounds] : normal S1 and S2 [Nondistended] : nondistended [Abdomen Tenderness] : non-tender [Cervical Lymph Nodes Enlarged Posterior Bilaterally] : posterior cervical [Cervical Lymph Nodes Enlarged Anterior Bilaterally] : anterior cervical [Supraclavicular Lymph Nodes Enlarged Bilaterally] : supraclavicular [Axillary Lymph Nodes Enlarged Bilaterally] : axillary [Normal] : oriented to person, place and time, the affect was normal, the mood was normal and not anxious [de-identified] : She is examined in both the upright and supine positions.  The right breast has mild residual hyperpigmentation in the treated field. Well healed scar.   The left breast is unremarkable.  No palpable mass in either breast.

## 2021-12-02 NOTE — LETTER CLOSING
[Consult Closing:] : Thank you for allowing me to participate in the care of this patient.  If you have any questions, please do not hesitate to contact me. [Sincerely yours,] : Sincerely yours, [FreeTextEntry3] : Heather Bauer M.D. \par \par Electronically proofread and signed by:  Heather Bauer MD\par Attending, Department of Radiation Medicine\par [facility]\par \par CC: Dr. Squires

## 2021-12-02 NOTE — HISTORY OF PRESENT ILLNESS
[FreeTextEntry1] : \par VERONICA SANDOVAL returns to clinic in follow up visit.  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 She is s/p breast conserving surgery.  The patient received 4005cGy to the partial right breast volume from 10/12/2021  through 11/1/2021 without complications.  I last saw her in October.    In the interim, the patient reports that she is fatigued. She c/o occasional breast discomfort. She is taking tamoxifen as prescribed and has noted fatigue, hot flashes, loss of appetite, lack of interest, since starting Tamoxifen.\par \par Upcoming appointments: \par \par Dr. Squires 12/30/2021\par Dr. Deshpande 2/22/2022\par Dr. Sweet 2/24/2022\par

## 2021-12-02 NOTE — DISEASE MANAGEMENT
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

## 2021-12-30 ENCOUNTER — APPOINTMENT (OUTPATIENT)
Dept: HEMATOLOGY ONCOLOGY | Facility: CLINIC | Age: 51
End: 2021-12-30

## 2022-02-24 ENCOUNTER — APPOINTMENT (OUTPATIENT)
Dept: BREAST CENTER | Facility: CLINIC | Age: 52
End: 2022-02-24

## 2022-02-28 ENCOUNTER — OUTPATIENT (OUTPATIENT)
Dept: OUTPATIENT SERVICES | Facility: HOSPITAL | Age: 52
LOS: 1 days | Discharge: HOME | End: 2022-02-28
Payer: COMMERCIAL

## 2022-02-28 ENCOUNTER — RESULT REVIEW (OUTPATIENT)
Age: 52
End: 2022-02-28

## 2022-02-28 DIAGNOSIS — R92.8 OTHER ABNORMAL AND INCONCLUSIVE FINDINGS ON DIAGNOSTIC IMAGING OF BREAST: ICD-10-CM

## 2022-02-28 DIAGNOSIS — Z98.890 OTHER SPECIFIED POSTPROCEDURAL STATES: Chronic | ICD-10-CM

## 2022-02-28 DIAGNOSIS — Z90.711 ACQUIRED ABSENCE OF UTERUS WITH REMAINING CERVICAL STUMP: Chronic | ICD-10-CM

## 2022-02-28 PROCEDURE — G0279: CPT | Mod: 26

## 2022-02-28 PROCEDURE — 77066 DX MAMMO INCL CAD BI: CPT | Mod: 26

## 2022-03-03 ENCOUNTER — APPOINTMENT (OUTPATIENT)
Dept: HEMATOLOGY ONCOLOGY | Facility: CLINIC | Age: 52
End: 2022-03-03

## 2022-04-05 NOTE — DISCUSSION/SUMMARY
[Cancer Type / Location / Histology Subtype: ________] : Cancer Type / Location / Histology Subtype: [unfilled] [Diagnosis Date (year): ____] : Diagnosis Date (year): [unfilled] [Surgery] : Surgery: Yes [Surgery Date(s) (year): ____] : Surgery Date(s) (year): [unfilled] [Surgical Procedure / Location / Findings: _________] : Surgical Procedure / Location / Findings: [unfilled] [Radiation] : Radiation: Yes [Body Area Treated: _________] : Body Area Treated: [unfilled] [End Date (year): ____] : End Date (year): [unfilled] [Need for onging (adjuvant) treatment for cancer?] : Need for onging (adjuvant) treatment for cancer? Yes [Follow up with Oncologist in _____] : Follow up with Oncologist in [unfilled] [Follow up with Surgeon in _____] : Follow up with Surgeon in [unfilled] [Follow up with Radiation MD in _____] : Follow up with Radiation MD in [unfilled] [Primary care physician] : primary care physician [Colonoscopy] : colonoscopy [Mammogram] : Mammogram [Emotional and mental health] : Emotional and mental health [Bridge to Survivorship Breast Cancer] : Bridge to Survivorship Breast Cancer [FreeTextEntry2] : Tamoxifen [FreeTextEntry8] : Mary Lou Shah

## 2022-06-02 ENCOUNTER — APPOINTMENT (OUTPATIENT)
Dept: RADIATION ONCOLOGY | Facility: HOSPITAL | Age: 52
End: 2022-06-02
Payer: COMMERCIAL

## 2022-06-02 ENCOUNTER — OUTPATIENT (OUTPATIENT)
Dept: OUTPATIENT SERVICES | Facility: HOSPITAL | Age: 52
LOS: 1 days | Discharge: HOME | End: 2022-06-02

## 2022-06-02 VITALS
BODY MASS INDEX: 23.46 KG/M2 | SYSTOLIC BLOOD PRESSURE: 128 MMHG | OXYGEN SATURATION: 99 % | DIASTOLIC BLOOD PRESSURE: 71 MMHG | WEIGHT: 141 LBS | RESPIRATION RATE: 14 BRPM | TEMPERATURE: 96.6 F | HEART RATE: 93 BPM

## 2022-06-02 DIAGNOSIS — Z90.711 ACQUIRED ABSENCE OF UTERUS WITH REMAINING CERVICAL STUMP: Chronic | ICD-10-CM

## 2022-06-02 DIAGNOSIS — Z98.890 OTHER SPECIFIED POSTPROCEDURAL STATES: Chronic | ICD-10-CM

## 2022-06-02 PROCEDURE — 99213 OFFICE O/P EST LOW 20 MIN: CPT

## 2022-06-02 NOTE — DISEASE MANAGEMENT
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

## 2022-06-02 NOTE — HISTORY OF PRESENT ILLNESS
[FreeTextEntry1] : VERONICA SANDOVAL returns to clinic in follow up visit. 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 She is s/p breast conserving surgery. The patient received 4005cGy to the partial right breast volume from 10/12/2021 through 11/1/2021 without complications. I last saw her in December 2021. In the interim, the patient reports that she has occasional stabbing pain to both breasts which doesn't last long no pain medication. Patient notes that since she started taking tamoxifen, she has noted fatigue, hot flashes, and loss of appetite.\par \par Last Mammo 2/28/22  No evidence of malignancy\par \par

## 2022-06-02 NOTE — LETTER CLOSING
[Consult Closing:] : Thank you for allowing me to participate in the care of this patient.  If you have any questions, please do not hesitate to contact me. [Sincerely yours,] : Sincerely yours, [FreeTextEntry3] : Heather Bauer M.D. \par \par Electronically proofread and signed by:  Heather Bauer MD\par Attending, Department of Radiation Medicine\par [facility]\par \par CC: Dr. Squires

## 2022-06-02 NOTE — PHYSICAL EXAM
[Thin] : thin [Sclera] : the sclera and conjunctiva were normal [Hearing Threshold Finger Rub Not [place]] : hearing was normal [Heart Rate And Rhythm] : heart rate and rhythm were normal [Heart Sounds] : normal S1 and S2 [Nondistended] : nondistended [Abdomen Tenderness] : non-tender [Cervical Lymph Nodes Enlarged Posterior Bilaterally] : posterior cervical [Cervical Lymph Nodes Enlarged Anterior Bilaterally] : anterior cervical [Supraclavicular Lymph Nodes Enlarged Bilaterally] : supraclavicular [Axillary Lymph Nodes Enlarged Bilaterally] : axillary [Normal] : oriented to person, place and time, the affect was normal, the mood was normal and not anxious [de-identified] : She is examined in both the upright and supine positions.  The right breast has a well healed keloid scar.  The left breast is unremarkable.  No palpable mass in either breast.

## 2022-06-03 ENCOUNTER — RX RENEWAL (OUTPATIENT)
Age: 52
End: 2022-06-03

## 2022-06-03 DIAGNOSIS — D05.11 INTRADUCTAL CARCINOMA IN SITU OF RIGHT BREAST: ICD-10-CM

## 2022-06-13 NOTE — HISTORY OF PRESENT ILLNESS
Assumed care of the patient. Patient was visible on the milieu tonight. She was seen interacting well with the peers and the staffs. Patient denied S.I/H. I/A/V/H, confirmed anxiety and depression. PRN Trazodone for sleep and Atarax for anxiety administered upon request. Will continue to monitor and provide support as needed. PRN Motrin for back pain and again Atarax for anxiety given at 0252. Hourly nursing rounds completed. Patient appeared to be sleeping for about 7 hours. [FreeTextEntry1] : PBI - 3/15 fx\par \par Patient reports doing well, however feels moderate fatigue due to taking care of her 13 y/o daughter and cleaning and cooking. She denied any breast pain. She was instructed to stop using olive oil for moisturizer, and to use either Aquaphor or Eucerin. Otherwise, no new concerns.

## 2022-07-27 ENCOUNTER — NON-APPOINTMENT (OUTPATIENT)
Age: 52
End: 2022-07-27

## 2022-08-04 ENCOUNTER — NON-APPOINTMENT (OUTPATIENT)
Age: 52
End: 2022-08-04

## 2022-08-05 ENCOUNTER — APPOINTMENT (OUTPATIENT)
Dept: OBGYN | Facility: CLINIC | Age: 52
End: 2022-08-05

## 2022-08-05 VITALS
BODY MASS INDEX: 23.32 KG/M2 | HEIGHT: 65 IN | DIASTOLIC BLOOD PRESSURE: 80 MMHG | WEIGHT: 140 LBS | SYSTOLIC BLOOD PRESSURE: 140 MMHG

## 2022-08-05 DIAGNOSIS — N83.8 OTHER NONINFLAMMATORY DISORDERS OF OVARY, FALLOPIAN TUBE AND BROAD LIGAMENT: ICD-10-CM

## 2022-08-05 DIAGNOSIS — Z01.419 ENCOUNTER FOR GYNECOLOGICAL EXAMINATION (GENERAL) (ROUTINE) W/OUT ABNORMAL FINDINGS: ICD-10-CM

## 2022-08-05 PROCEDURE — 99386 PREV VISIT NEW AGE 40-64: CPT | Mod: 25

## 2022-08-05 PROCEDURE — 76830 TRANSVAGINAL US NON-OB: CPT

## 2022-08-05 NOTE — PLAN
[FreeTextEntry1] : VERONICA SANDOVAL is a 52 year female\par for pap and ca-125 and cea.  disucsseed possible oopherectomy

## 2022-08-05 NOTE — PROCEDURE
[Pelvic Pain] : pelvic pain [Transvaginal Ultrasound] : transvaginal ultrasound [Absent] : absent [FreeTextEntry5] : cx 2.95 [FreeTextEntry7] : 5.44 clear cyst.   [FreeTextEntry4] : right ovarian cyst

## 2022-08-08 LAB
CANCER AG125 SERPL-ACNC: 7 U/ML
CEA SERPL-MCNC: 0.8 NG/ML

## 2022-08-09 LAB — HPV HIGH+LOW RISK DNA PNL CVX: NOT DETECTED

## 2022-08-12 LAB — CYTOLOGY CVX/VAG DOC THIN PREP: NORMAL

## 2022-08-16 ENCOUNTER — APPOINTMENT (OUTPATIENT)
Dept: HEMATOLOGY ONCOLOGY | Facility: CLINIC | Age: 52
End: 2022-08-16

## 2022-08-16 ENCOUNTER — OUTPATIENT (OUTPATIENT)
Dept: OUTPATIENT SERVICES | Facility: HOSPITAL | Age: 52
LOS: 1 days | Discharge: HOME | End: 2022-08-16

## 2022-08-16 VITALS
BODY MASS INDEX: 23.49 KG/M2 | WEIGHT: 141 LBS | TEMPERATURE: 97.2 F | DIASTOLIC BLOOD PRESSURE: 68 MMHG | HEART RATE: 88 BPM | SYSTOLIC BLOOD PRESSURE: 125 MMHG | HEIGHT: 65 IN

## 2022-08-16 DIAGNOSIS — Z79.810 ENCOUNTER FOR THERAPEUTIC DRUG LVL MONITORING: ICD-10-CM

## 2022-08-16 DIAGNOSIS — Z98.890 OTHER SPECIFIED POSTPROCEDURAL STATES: Chronic | ICD-10-CM

## 2022-08-16 DIAGNOSIS — Z51.81 ENCOUNTER FOR THERAPEUTIC DRUG LVL MONITORING: ICD-10-CM

## 2022-08-16 DIAGNOSIS — Z90.711 ACQUIRED ABSENCE OF UTERUS WITH REMAINING CERVICAL STUMP: Chronic | ICD-10-CM

## 2022-08-16 PROCEDURE — 99214 OFFICE O/P EST MOD 30 MIN: CPT

## 2022-08-19 ENCOUNTER — APPOINTMENT (OUTPATIENT)
Dept: GASTROENTEROLOGY | Facility: CLINIC | Age: 52
End: 2022-08-19

## 2022-08-19 VITALS — HEIGHT: 65 IN | BODY MASS INDEX: 23.66 KG/M2 | WEIGHT: 142 LBS

## 2022-08-19 DIAGNOSIS — Z86.39 PERSONAL HISTORY OF OTHER ENDOCRINE, NUTRITIONAL AND METABOLIC DISEASE: ICD-10-CM

## 2022-08-19 DIAGNOSIS — Z78.9 OTHER SPECIFIED HEALTH STATUS: ICD-10-CM

## 2022-08-19 PROCEDURE — 99204 OFFICE O/P NEW MOD 45 MIN: CPT

## 2022-08-19 RX ORDER — POLYETHYLENE GLYCOL 3350 AND ELECTROLYTES WITH LEMON FLAVOR 236; 22.74; 6.74; 5.86; 2.97 G/4L; G/4L; G/4L; G/4L; G/4L
236 POWDER, FOR SOLUTION ORAL
Qty: 1 | Refills: 0 | Status: ACTIVE | COMMUNITY
Start: 2022-08-19 | End: 1900-01-01

## 2022-08-19 RX ORDER — BISMUTH SUBSALICYLATE 262 MG
262 TABLET,CHEWABLE ORAL
Refills: 0 | Status: ACTIVE | COMMUNITY

## 2022-08-22 PROBLEM — Z78.9 NON-SMOKER: Status: ACTIVE | Noted: 2022-08-19

## 2022-08-22 PROBLEM — Z78.9 CAFFEINE USE: Status: ACTIVE | Noted: 2022-08-19

## 2022-08-22 NOTE — HISTORY OF PRESENT ILLNESS
[de-identified] : 53yo female h/o breast ca s/p lumpectomy on tamoxifen, ovarian cyst, hysterectomy presents for evaluation for colonoscopy\par \par Pt reports intermittent LLQ pain for many years, thought component of pelvic pain following with gyn for right ovarian cyst. Notes some worsening with coffee creamer otherwise no obvious food trigger. Reports regular formed bm daily, occasional hard stools/strains, denies blood in stools.\par Reports intermittent nausea and heartburn, denies dysphagia. Takes pepcid which helps heartburn. Some early satiety and reduced appetite.Denies weight loss. States she has been taking iron intermittently.\par \par Reports prior colonoscopy 5-6 years ago findings unclear, possible EGD, states he had a ? h pylori that was treated. \par No known family h/o colon ca\par \par Labs reviewed (4/2022)\par \par

## 2022-08-22 NOTE — PHYSICAL EXAM
[General Appearance - Alert] : alert [General Appearance - Well Nourished] : well nourished [General Appearance - Well-Appearing] : healthy appearing [Sclera] : the sclera and conjunctiva were normal [] : no respiratory distress [Exaggerated Use Of Accessory Muscles For Inspiration] : no accessory muscle use [Bowel Sounds] : normal bowel sounds [Abdomen Soft] : soft [Abdomen Tenderness] : non-tender [FreeTextEntry1] : possibly mild distension (normal per pt), soft, no tenderness elicited on exam

## 2022-08-22 NOTE — REVIEW OF SYSTEMS
[As Noted in HPI] : as noted in HPI [Pelvic Pain] : pelvic pain [Abdominal Pain] : abdominal pain [Negative] : Neurological [Vomiting] : no vomiting [Diarrhea] : no diarrhea

## 2022-08-22 NOTE — ASSESSMENT
[FreeTextEntry1] : 51yo female h/o breast ca s/p lumpectomy on tamoxifen, ovarian cyst, hysterectomy presents for evaluation for colonoscopy. Intermittent nausea and LLQ pain noted and possible slight distension/increase in abd girth.\par \par -Recommend obtain CT abd/pelvis to further evaluate\par -Pending results would recommend EGD and colonoscopy to further evaluate including to reassess for h pylori infection and pt due for crc screening purposes\par -Discussed risks/benefits with pt and she wants to proceed\par -Pepcid 20mg daily/prn\par -Antireflux measures advised\par \par Follow up 4-6 weeks\par Pt agreeable with plan, advised to contact us if questions/concerns in the interim\par

## 2022-08-23 NOTE — PHYSICAL EXAM
[Fully active, able to carry on all pre-disease performance without restriction] : Status 0 - Fully active, able to carry on all pre-disease performance without restriction [Normal] : bilateral breasts without nipple retraction, skin dimpling or palpable masses; the bilateral axillae are without adenopathy [de-identified] : surgical incision on right breast healed well; bilateral breast exam unrevealing

## 2022-08-23 NOTE — ASSESSMENT
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 She is s/p breast conserving surgery and adjuvant RT. \par \par Pt became menopausal after hysterectomy for fibroids. Ovaries were not removed.\par \par We had a detailed discussion regarding her diagnosis and therapeutic options. \par The natural history and prognosis of DCIS was discussed at length.\par She has already completed local treatment without any serious complications.\par She was recommended adjuvant endocrine therapy with Tamoxifen 20 mg daily X 5 years and she agreed. \par \par RECOMMENDATIONS: \par Previous notes reviewed and all relevant radiology results discussed with Dr Deshpande and were communicated to the patients.\par \par -- Continue Tamoxifen 20 mg daily, eRx sent.\par Side effects of Tamoxifen including hot flashes, menstrual irregularities, weight gain, mood changes, DVT, cataracts  were discussed.\par -- Plan for follow up including need for imaging Rt breast with mammogram Q 6 months X 2 years was discussed.\par -- Due for right dx mammogram, script given.\par -- Encouraged a healthy life style with regular exercise, healthy diet.\par -- Follow up with Breast Surgeon, Rad/Onc, PCP, gyne and GI for health maintenance.\par \par All her concerns were addressed during the visit.\par RT in 6 months.\par \par Case was seen and discussed with Dr. Deshpande who agreed with assessment and plan.\par

## 2022-08-23 NOTE — HISTORY OF PRESENT ILLNESS
[Disease: _____________________] : Disease: [unfilled] [T: ___] : T[unfilled] [N: ___] : N[unfilled] [M: ___] : M[unfilled] [AJCC Stage: ____] : AJCC Stage: [unfilled] [de-identified] : This is a very pleasant 51 year old woman who is being eval for Rt breast DCIS\par \par Work up so far\par screening mammogram (4/13/2021) :  calcifications in the right breast at 12 o’clock. Diagnostic mammogram and ultrasound on 4/16/2021 showed indeterminate calcifications in the right breast. Stereotactically guided core biopsy recommended. BI-RADS 4: Suspicious.\par \par On 5/4/2021, she had a biopsy of the right breast abnormality at 12 o’clock and pathology showed ductal carcinoma in situ (DCIS), intermediate to high nuclear grade, solid type, with focal necrosis and calcifications. It was ER positive / WA negative.\par On 6/4/2021, two (2) outside slide for review from [facility] shows ductal carcinoma in-situ (DCIS), solid type with single cell necrosis and calcifications, intermediate nuclear grade. It was ER positive and WA negative. \par \par She also had an MRI of bilateral breasts on 6/17/2021, which showed indeterminate areas of abnormal enhancement in each breast. MRI guided biopsy of both breasts (1 site in each breast) is recommended.\par Biopsy-proven DCIS in the right breast.\par Recommendation: MRI guided biopsy.\par BI-RADS Category 4: Suspicious\par \par On 6/22/2021, bilateral stereotactic biopsy was performed and pathology was benign in both breasts. \par \par On 7/14/2021, She underwent a wide local excision, right breast at 12 o'clock, superior middle 1/3. She was found to have DCIS, G2, ER positive / WA negative. Surgical margins were very close: 0.2mm. She had a re-excision on 8/13/2021 and there was no residual disease. \par \par Prelude Dx (7/14/2021) DCISonRT score results shows 3 (0-10 scale).\par \par She has completed RT on 11/1/21\par \par \par She has been doing well since surgery.    Patient denies any new palpable breast lumps or pain, denies skin changes, denies nipple discharge.  Patient denies cough, shortness of breath, denies fever, denies bone pain.\par \par Menstrual Hx: The patient is in surgical menopause. age at menarche was 16. age at menopause was 45. \par Past Gyn Surgeries: partial hysterectomy 2016. \par Prior pregnancies: G3 and P3 . Age at live birth: 19 \par Birth Control Pill Use: yes, stopped in 1997. \par Breast Feeding History: yes for 6 months \par \par \par  [de-identified] : DCIS [de-identified] : 8/16/22\par Patient is here to follow up for breast cancer.\par She is on Tamoxifen 20 mg daily since 11/2021, compliant and tolerating well.\par She denies any breast mass/pain, skin changes or nipple discharge.\par She has appt with GI on Friday, 8/1/22.

## 2022-08-23 NOTE — RESULTS/DATA
[FreeTextEntry1] : EXAM:  MG MAMMO DIAG W JACQUELINE BI#\par PROCEDURE DATE:  02/28/2022\par \par INTERPRETATION:  Clinical History / Reason for exam: Patient presents for follow-up as she is status post a right lumpectomy in July 2021.\par \par The patient reports her last clinical breast examination was performed 6 months ago\par \par Diagnostic mammogram with spot magnification views of the right lumpectomy site were performed and submitted for evaluation.\par \par Computer-aided detection was utilized in the interpretation of this examination.\par \par Comparison is made to the prior studies dating back to 2017.\par \par Breast composition:The breasts are heterogeneously dense, which may obscure small masses.\par \par The patient is status post a right lumpectomy with architectural distortion most consistent with postsurgical change.  No suspicious masses or abnormal groups of microcalcifications are seen in either breast.\par \par Impression: Status post a right lumpectomy with architectural distortion most consistent with postsurgical change.\par \par No evidence of malignancy.\par \par Recommendation: As per post lumpectomy routine, a follow-up unilateral right mammogram is recommended.\par \par BI-RADS Category 2: Benign\par \par \par

## 2022-08-24 ENCOUNTER — RESULT REVIEW (OUTPATIENT)
Age: 52
End: 2022-08-24

## 2022-08-24 DIAGNOSIS — Z51.81 ENCOUNTER FOR THERAPEUTIC DRUG LEVEL MONITORING: ICD-10-CM

## 2022-08-24 DIAGNOSIS — D05.11 INTRADUCTAL CARCINOMA IN SITU OF RIGHT BREAST: ICD-10-CM

## 2022-08-29 ENCOUNTER — OUTPATIENT (OUTPATIENT)
Dept: OUTPATIENT SERVICES | Facility: HOSPITAL | Age: 52
LOS: 1 days | Discharge: HOME | End: 2022-08-29

## 2022-08-29 ENCOUNTER — RESULT REVIEW (OUTPATIENT)
Age: 52
End: 2022-08-29

## 2022-08-29 DIAGNOSIS — Z98.890 OTHER SPECIFIED POSTPROCEDURAL STATES: Chronic | ICD-10-CM

## 2022-08-29 DIAGNOSIS — Z90.711 ACQUIRED ABSENCE OF UTERUS WITH REMAINING CERVICAL STUMP: Chronic | ICD-10-CM

## 2022-08-29 DIAGNOSIS — R92.8 OTHER ABNORMAL AND INCONCLUSIVE FINDINGS ON DIAGNOSTIC IMAGING OF BREAST: ICD-10-CM

## 2022-08-29 PROCEDURE — 77065 DX MAMMO INCL CAD UNI: CPT | Mod: 26,RT

## 2022-08-29 PROCEDURE — G0279: CPT | Mod: 26

## 2022-09-07 ENCOUNTER — OUTPATIENT (OUTPATIENT)
Dept: OUTPATIENT SERVICES | Facility: HOSPITAL | Age: 52
LOS: 1 days | Discharge: HOME | End: 2022-09-07

## 2022-09-07 DIAGNOSIS — R10.32 LEFT LOWER QUADRANT PAIN: ICD-10-CM

## 2022-09-07 DIAGNOSIS — Z90.711 ACQUIRED ABSENCE OF UTERUS WITH REMAINING CERVICAL STUMP: Chronic | ICD-10-CM

## 2022-09-07 DIAGNOSIS — Z98.890 OTHER SPECIFIED POSTPROCEDURAL STATES: Chronic | ICD-10-CM

## 2022-09-07 PROCEDURE — 74177 CT ABD & PELVIS W/CONTRAST: CPT | Mod: 26

## 2022-09-21 ENCOUNTER — TRANSCRIPTION ENCOUNTER (OUTPATIENT)
Age: 52
End: 2022-09-21

## 2022-09-21 ENCOUNTER — OUTPATIENT (OUTPATIENT)
Dept: OUTPATIENT SERVICES | Facility: HOSPITAL | Age: 52
LOS: 1 days | Discharge: HOME | End: 2022-09-21

## 2022-09-21 ENCOUNTER — RESULT REVIEW (OUTPATIENT)
Age: 52
End: 2022-09-21

## 2022-09-21 VITALS
HEIGHT: 65 IN | TEMPERATURE: 98 F | HEART RATE: 76 BPM | DIASTOLIC BLOOD PRESSURE: 89 MMHG | SYSTOLIC BLOOD PRESSURE: 161 MMHG | RESPIRATION RATE: 18 BRPM | WEIGHT: 139.99 LBS

## 2022-09-21 VITALS
OXYGEN SATURATION: 100 % | HEART RATE: 74 BPM | SYSTOLIC BLOOD PRESSURE: 153 MMHG | RESPIRATION RATE: 14 BRPM | DIASTOLIC BLOOD PRESSURE: 71 MMHG

## 2022-09-21 DIAGNOSIS — Z90.711 ACQUIRED ABSENCE OF UTERUS WITH REMAINING CERVICAL STUMP: Chronic | ICD-10-CM

## 2022-09-21 DIAGNOSIS — Z98.890 OTHER SPECIFIED POSTPROCEDURAL STATES: Chronic | ICD-10-CM

## 2022-09-21 PROCEDURE — 88305 TISSUE EXAM BY PATHOLOGIST: CPT | Mod: 26

## 2022-09-21 PROCEDURE — 45378 DIAGNOSTIC COLONOSCOPY: CPT

## 2022-09-21 PROCEDURE — 43239 EGD BIOPSY SINGLE/MULTIPLE: CPT

## 2022-09-21 PROCEDURE — 88312 SPECIAL STAINS GROUP 1: CPT | Mod: 26

## 2022-09-21 RX ORDER — ERGOCALCIFEROL 1.25 MG/1
0 CAPSULE ORAL
Qty: 0 | Refills: 0 | DISCHARGE

## 2022-09-21 RX ORDER — FERROUS SULFATE 325(65) MG
324 TABLET ORAL
Qty: 0 | Refills: 0 | DISCHARGE

## 2022-09-21 RX ORDER — TAMOXIFEN CITRATE 20 MG/1
1 TABLET, FILM COATED ORAL
Qty: 0 | Refills: 0 | DISCHARGE

## 2022-09-21 NOTE — ASU DISCHARGE PLAN (ADULT/PEDIATRIC) - NS MD DC FALL RISK RISK
For information on Fall & Injury Prevention, visit: [email or web address] OR  [email or web address] OR  https://www.cdc.gov/steadi/patient.html

## 2022-09-21 NOTE — H&P PST ADULT - ASSESSMENT
51yo female h/o breast ca s/p lumpectomy on tamoxifen, ovarian cyst, hysterectomy presents for evaluation for colonoscopy for CRC screening and EGD for nausea, heart burn, ?h/o HP

## 2022-09-21 NOTE — ASU DISCHARGE PLAN (ADULT/PEDIATRIC) - CARE PROVIDER_API CALL
Kat Reilly)  Gastroenterology; Internal Medicine  [address] [place] [address]  Phone: [phone]  Fax: [phone]  Follow Up Time:

## 2022-09-21 NOTE — CHART NOTE - NSCHARTNOTEFT_GEN_A_CORE
ENDO RECOVERY ADMISSION NOTE      Procedure:   Post op diagnosis:      ____  Intubated  TV:______       Rate: ______      FiO2: ______    __x__  Patent Airway    __x__  Full return of protective reflexes    __x__  Full recovery from anesthesia / back to baseline status    Vitals  HR: 70  BP: 143/85  RR: 15  O2 Sat: 100  Temp: 97.6    Mental Status:  __x__ Awake   _____ Alert   _____ Drowsy   _____ Sedated    Nausea/Vomiting:  __x__ NO  ______Yes,   See Post - Op Orders          Pain Scale (0-10):  _____    Treatment: ____ None    __x__ See Post - Op/PCA Orders    Post - Operative Fluids:   ____ Oral   __x__ See Post - Op Orders    Plan: Discharge when criteria met:   _x___Home       _____Floor     _____Critical Care   Other:_________________    Comments: Patient had smooth intraoperative event, no anesthesia complication.

## 2022-09-22 LAB
ALBUMIN SERPL ELPH-MCNC: 4 G/DL
ALP BLD-CCNC: 51 U/L
ALT SERPL-CCNC: 20 U/L
ANION GAP SERPL CALC-SCNC: 10 MMOL/L
AST SERPL-CCNC: 17 U/L
BASOPHILS # BLD AUTO: 0.04 K/UL
BASOPHILS NFR BLD AUTO: 0.9 %
BILIRUB SERPL-MCNC: <0.2 MG/DL
BUN SERPL-MCNC: 9 MG/DL
CALCIUM SERPL-MCNC: 9 MG/DL
CHLORIDE SERPL-SCNC: 106 MMOL/L
CO2 SERPL-SCNC: 25 MMOL/L
CREAT SERPL-MCNC: 0.9 MG/DL
EGFR: 77 ML/MIN/1.73M2
EOSINOPHIL # BLD AUTO: 0.05 K/UL
EOSINOPHIL NFR BLD AUTO: 1.2 %
FERRITIN SERPL-MCNC: 216 NG/ML
GLUCOSE SERPL-MCNC: 90 MG/DL
HCT VFR BLD CALC: 36 %
HGB BLD-MCNC: 11.3 G/DL
IMM GRANULOCYTES NFR BLD AUTO: 0 %
IRON SATN MFR SERPL: 32 %
IRON SERPL-MCNC: 84 UG/DL
LYMPHOCYTES # BLD AUTO: 1.65 K/UL
LYMPHOCYTES NFR BLD AUTO: 39 %
MAN DIFF?: NORMAL
MCHC RBC-ENTMCNC: 26 PG
MCHC RBC-ENTMCNC: 31.4 G/DL
MCV RBC AUTO: 82.8 FL
MONOCYTES # BLD AUTO: 0.37 K/UL
MONOCYTES NFR BLD AUTO: 8.7 %
NEUTROPHILS # BLD AUTO: 2.12 K/UL
NEUTROPHILS NFR BLD AUTO: 50.2 %
PLATELET # BLD AUTO: 193 K/UL
POTASSIUM SERPL-SCNC: 4.4 MMOL/L
PROT SERPL-MCNC: 6.4 G/DL
RBC # BLD: 4.35 M/UL
RBC # FLD: 13.1 %
SODIUM SERPL-SCNC: 141 MMOL/L
TIBC SERPL-MCNC: 265 UG/DL
UIBC SERPL-MCNC: 181 UG/DL
WBC # FLD AUTO: 4.23 K/UL

## 2022-09-23 LAB — SURGICAL PATHOLOGY STUDY: SIGNIFICANT CHANGE UP

## 2022-09-26 DIAGNOSIS — K21.9 GASTRO-ESOPHAGEAL REFLUX DISEASE WITHOUT ESOPHAGITIS: ICD-10-CM

## 2022-09-26 DIAGNOSIS — Z85.3 PERSONAL HISTORY OF MALIGNANT NEOPLASM OF BREAST: ICD-10-CM

## 2022-09-26 DIAGNOSIS — Z12.11 ENCOUNTER FOR SCREENING FOR MALIGNANT NEOPLASM OF COLON: ICD-10-CM

## 2022-09-26 DIAGNOSIS — K29.50 UNSPECIFIED CHRONIC GASTRITIS WITHOUT BLEEDING: ICD-10-CM

## 2022-09-26 DIAGNOSIS — K64.8 OTHER HEMORRHOIDS: ICD-10-CM

## 2022-09-26 DIAGNOSIS — I10 ESSENTIAL (PRIMARY) HYPERTENSION: ICD-10-CM

## 2022-09-26 DIAGNOSIS — Z90.710 ACQUIRED ABSENCE OF BOTH CERVIX AND UTERUS: ICD-10-CM

## 2022-10-10 ENCOUNTER — APPOINTMENT (OUTPATIENT)
Dept: GASTROENTEROLOGY | Facility: CLINIC | Age: 52
End: 2022-10-10

## 2022-10-10 VITALS — WEIGHT: 139 LBS | HEIGHT: 65 IN | BODY MASS INDEX: 23.16 KG/M2

## 2022-10-10 PROCEDURE — 99212 OFFICE O/P EST SF 10 MIN: CPT

## 2022-10-10 NOTE — PHYSICAL EXAM
[General Appearance - Alert] : alert [General Appearance - Well Nourished] : well nourished [General Appearance - Well-Appearing] : healthy appearing [Sclera] : the sclera and conjunctiva were normal [] : no respiratory distress [Exaggerated Use Of Accessory Muscles For Inspiration] : no accessory muscle use [Bowel Sounds] : normal bowel sounds [Alert] : alert [Well Developed] : well developed [Well Nourished] : well nourished [No Respiratory Distress] : no respiratory distress [No Acc Muscle Use] : no accessory muscle use [Abdomen Tenderness] : non-tender [Abdomen Soft] : soft [de-identified] : Abd soft, nt, nd [FreeTextEntry1] : possibly mild distension (normal per pt), soft, no tenderness elicited on exam

## 2022-10-10 NOTE — REVIEW OF SYSTEMS
[Pelvic Pain] : pelvic pain [As Noted in HPI] : as noted in HPI [Heartburn] : heartburn [Negative] : Neurological [Fever] : no fever [Chills] : no chills [Sore Throat] : no sore throat [Hoarseness] : no hoarseness [Chest Pain] : no chest pain [Palpitations] : no palpitations [Abdominal Pain] : no abdominal pain [Constipation] : no constipation [Bleeding] : no bleeding [Bloating (gassiness)] : no bloating [Vomiting] : no vomiting [Diarrhea] : no diarrhea

## 2022-10-10 NOTE — REASON FOR VISIT
[Follow-up] : a follow-up of an existing diagnosis [Initial Evaluation] : an initial evaluation [FreeTextEntry1] : NPA - ABD PAIN/BLOATNG

## 2022-10-10 NOTE — ASSESSMENT
[FreeTextEntry1] : 53yo female h/o breast ca s/p lumpectomy on tamoxifen, ovarian cyst, hysterectomy presents for follow up after EGD/colonoscopy. EGD performed on 9/21/22 revealing mild chronic gastritis, no evidence of H pylori or celiac disease. Colonoscopy revealing internal hemorrhoids, no adenomas identified, fair prep in right colon. Occasional heartburn and epigastric discomfort otherwise symptoms improved. No evidence of iron deficiency on recent labs.\par \par -Monitor for dietary triggers\par -Pepcid 20mg daily/prn\par -Antireflux measures including elevating hob and to avoid lying down for 2-3 hours after meals\par -Monitor off iron, recent labs not showing iron deficiency, advised repeat labs in 2 months to reassess\par -Repeat colonoscopy in 3 years, sooner if new symptoms develop\par \par Follow up 3 months, sooner if needed\par Pt agreeable with plan, advised to contact us if questions/concerns in the interim\par

## 2022-10-10 NOTE — HISTORY OF PRESENT ILLNESS
[Home] : at home, [unfilled] , at the time of the visit. [Medical Office: ([facility])___] : at the medical office located in  [Verbal consent obtained from patient] : the patient, [unfilled] [FreeTextEntry4] : NICK SMITH [FreeTextEntry1] : 9/21/22 [de-identified] : 9/7/22: no acute pathology [de-identified] : 51yo female h/o breast ca s/p lumpectomy on tamoxifen, ovarian cyst, hysterectomy presents for follow up after EGD and colonoscopy\par \par EGD performed on 9/21/22 revealing mild chronic gastritis, no evidence of H pylori or celiac disease\par Colonoscopy revealing internal hemorrhoids, no adenomas identified, fair prep in right colon.\par \par Pt feeling well overall, reports occasional epigastric discomfort and  heartburn, takes pepcid which helps when needed. LLQ pain and nausea appears improved, denies pain currently. Denies n/v. Appetite ok. Denies weight loss. States she has been taking iron intermittently for few years. \par \par Reports prior colonoscopy 5-6 years ago findings unclear, possible EGD, states he had a ? h pylori that was treated. \par No known family h/o colon ca\par \par Labs reviewed (8/2022):\par Hb 11.3, mcv normal\par LFTs normal\par Ferritin 216\par \par

## 2023-01-13 ENCOUNTER — APPOINTMENT (OUTPATIENT)
Dept: GASTROENTEROLOGY | Facility: CLINIC | Age: 53
End: 2023-01-13
Payer: COMMERCIAL

## 2023-01-13 DIAGNOSIS — R14.0 ABDOMINAL DISTENSION (GASEOUS): ICD-10-CM

## 2023-01-13 PROCEDURE — 99213 OFFICE O/P EST LOW 20 MIN: CPT | Mod: 95

## 2023-01-13 RX ORDER — PNV NO.95/FERROUS FUM/FOLIC AC 28MG-0.8MG
325 TABLET ORAL
Refills: 0 | Status: DISCONTINUED | COMMUNITY
End: 2023-01-13

## 2023-01-13 NOTE — REVIEW OF SYSTEMS
[Constipation] : constipation [As Noted in HPI] : as noted in HPI [Pelvic Pain] : pelvic pain [Negative] : Neurological [Fever] : no fever [Chills] : no chills [Sore Throat] : no sore throat [Hoarseness] : no hoarseness [Chest Pain] : no chest pain [Palpitations] : no palpitations [Cough] : no cough [SOB on Exertion] : no shortness of breath during exertion [Abdominal Pain] : no abdominal pain [Heartburn] : no heartburn [Swollen Glands] : no swollen glands [Vomiting] : no vomiting [Diarrhea] : no diarrhea

## 2023-01-13 NOTE — HISTORY OF PRESENT ILLNESS
[Home] : at home, [unfilled] , at the time of the visit. [Medical Office: ([facility])___] : at the medical office located in  [Verbal consent obtained from patient] : the patient, [unfilled] [FreeTextEntry4] : NICK SMITH [FreeTextEntry1] : 9/21/22 [de-identified] : 9/7/22: no acute pathology [de-identified] : 53yo female h/o breast ca s/p lumpectomy on tamoxifen, ovarian cyst, hysterectomy presents for follow up of abdominal pain after EGD and colonoscopy\par \par EGD performed on 9/21/22 revealing mild chronic gastritis, no evidence of H pylori or celiac disease\par Colonoscopy revealing internal hemorrhoids, no adenomas identified, fair prep in right colon.\par \par Pt reports feeling well overall, previously reported intermittent LLQ pain and nausea, improved at last visit, now states LLQ/lower abd pain recurs intermittently, previously had CT abd/pelvis which was unremarkable, states she will be seeing gyn for follow up for ovarian cysts. Denies further nausea or heartburn. not taking pepcid. Denies epigastric pain. Reports occasional loose stool alternating with constipation, sometimes hard stools no blood.  eats plenty of salads, drinks approx 4 cups water daily. Appetite ok. Denies weight loss. States she has been taking iron intermittently for few years, stopped at prior visit. \par \par Reports prior colonoscopy 5-6 years ago findings unclear\par No known family h/o colon ca\par \par Labs reviewed (8/2022):\par Hb 11.3, mcv normal\par LFTs normal\par Ferritin 216, %sat 32\par \par

## 2023-01-13 NOTE — ASSESSMENT
[FreeTextEntry1] : 53yo female h/o breast ca s/p lumpectomy on tamoxifen, ovarian cyst, hysterectomy presents for follow up after EGD/colonoscopy. EGD performed on 9/21/22 revealing mild chronic gastritis, no evidence of H pylori or celiac disease. Colonoscopy revealing internal hemorrhoids, no adenomas identified, fair prep in right colon. Still intermittent LLQ/pelvic pain s/p prior CT abd/pelvis which was unremarkable. heartburn/nausea improved. No evidence of iron deficiency on recent labs.\par \par 1. LLQ pain\par 2. Heartburn\par 3. Anemia\par -Update cbc, iron studies/ferritin \par -Monitor for dietary triggers\par -Encouraged adequate hydration 6-8 glasses water daily\par -Discussed moderating fiber intake\par -Pepcid 20mg daily/prn\par -Antireflux measures including elevating hob and to avoid lying down for 2-3 hours after meals\par -Follow up with gyn for LLQ/pelvic pain, does not appear to be GI related\par -Repeat colonoscopy in 3 years, sooner if new symptoms develop\par \par Follow up 2-3 months, sooner if needed\par Pt agreeable with plan, advised to contact us if questions/concerns in the interim\par

## 2023-01-24 ENCOUNTER — APPOINTMENT (OUTPATIENT)
Dept: OBGYN | Facility: CLINIC | Age: 53
End: 2023-01-24
Payer: COMMERCIAL

## 2023-01-24 ENCOUNTER — NON-APPOINTMENT (OUTPATIENT)
Age: 53
End: 2023-01-24

## 2023-01-24 VITALS — BODY MASS INDEX: 23.49 KG/M2 | WEIGHT: 141 LBS | HEIGHT: 65 IN

## 2023-01-24 DIAGNOSIS — R10.2 PELVIC AND PERINEAL PAIN: ICD-10-CM

## 2023-01-24 PROCEDURE — 99213 OFFICE O/P EST LOW 20 MIN: CPT | Mod: 25

## 2023-01-24 PROCEDURE — 76830 TRANSVAGINAL US NON-OB: CPT

## 2023-02-14 ENCOUNTER — APPOINTMENT (OUTPATIENT)
Dept: HEMATOLOGY ONCOLOGY | Facility: CLINIC | Age: 53
End: 2023-02-14

## 2023-03-01 ENCOUNTER — RESULT REVIEW (OUTPATIENT)
Age: 53
End: 2023-03-01

## 2023-03-01 ENCOUNTER — OUTPATIENT (OUTPATIENT)
Dept: OUTPATIENT SERVICES | Facility: HOSPITAL | Age: 53
LOS: 1 days | End: 2023-03-01
Payer: COMMERCIAL

## 2023-03-01 DIAGNOSIS — Z90.711 ACQUIRED ABSENCE OF UTERUS WITH REMAINING CERVICAL STUMP: Chronic | ICD-10-CM

## 2023-03-01 DIAGNOSIS — Z98.890 OTHER SPECIFIED POSTPROCEDURAL STATES: Chronic | ICD-10-CM

## 2023-03-01 DIAGNOSIS — R92.8 OTHER ABNORMAL AND INCONCLUSIVE FINDINGS ON DIAGNOSTIC IMAGING OF BREAST: ICD-10-CM

## 2023-03-01 DIAGNOSIS — Z00.8 ENCOUNTER FOR OTHER GENERAL EXAMINATION: ICD-10-CM

## 2023-03-01 PROCEDURE — 77066 DX MAMMO INCL CAD BI: CPT

## 2023-03-01 PROCEDURE — G0279: CPT

## 2023-03-01 PROCEDURE — G0279: CPT | Mod: 26

## 2023-03-01 PROCEDURE — 77066 DX MAMMO INCL CAD BI: CPT | Mod: 26

## 2023-03-06 ENCOUNTER — APPOINTMENT (OUTPATIENT)
Dept: HEMATOLOGY ONCOLOGY | Facility: CLINIC | Age: 53
End: 2023-03-06
Payer: COMMERCIAL

## 2023-03-06 ENCOUNTER — OUTPATIENT (OUTPATIENT)
Dept: OUTPATIENT SERVICES | Facility: HOSPITAL | Age: 53
LOS: 1 days | End: 2023-03-06
Payer: COMMERCIAL

## 2023-03-06 VITALS
BODY MASS INDEX: 24.66 KG/M2 | HEIGHT: 65 IN | SYSTOLIC BLOOD PRESSURE: 163 MMHG | DIASTOLIC BLOOD PRESSURE: 84 MMHG | TEMPERATURE: 98.1 F | WEIGHT: 148 LBS | HEART RATE: 95 BPM

## 2023-03-06 DIAGNOSIS — Z98.890 OTHER SPECIFIED POSTPROCEDURAL STATES: Chronic | ICD-10-CM

## 2023-03-06 DIAGNOSIS — Z90.711 ACQUIRED ABSENCE OF UTERUS WITH REMAINING CERVICAL STUMP: Chronic | ICD-10-CM

## 2023-03-06 DIAGNOSIS — D05.10 INTRADUCTAL CARCINOMA IN SITU OF UNSPECIFIED BREAST: ICD-10-CM

## 2023-03-06 PROCEDURE — 99214 OFFICE O/P EST MOD 30 MIN: CPT

## 2023-03-06 NOTE — HISTORY OF PRESENT ILLNESS
[Disease: _____________________] : Disease: [unfilled] [T: ___] : T[unfilled] [N: ___] : N[unfilled] [M: ___] : M[unfilled] [AJCC Stage: ____] : AJCC Stage: [unfilled] [de-identified] : This is a very pleasant 51 year old woman who is being eval for Rt breast DCIS\par \par Work up so far\par screening mammogram (4/13/2021) :  calcifications in the right breast at 12 o’clock. Diagnostic mammogram and ultrasound on 4/16/2021 showed indeterminate calcifications in the right breast. Stereotactically guided core biopsy recommended. BI-RADS 4: Suspicious.\par \par On 5/4/2021, she had a biopsy of the right breast abnormality at 12 o’clock and pathology showed ductal carcinoma in situ (DCIS), intermediate to high nuclear grade, solid type, with focal necrosis and calcifications. It was ER positive / ME negative.\par On 6/4/2021, two (2) outside slide for review from [facility] shows ductal carcinoma in-situ (DCIS), solid type with single cell necrosis and calcifications, intermediate nuclear grade. It was ER positive and ME negative. \par \par She also had an MRI of bilateral breasts on 6/17/2021, which showed indeterminate areas of abnormal enhancement in each breast. MRI guided biopsy of both breasts (1 site in each breast) is recommended.\par Biopsy-proven DCIS in the right breast.\par Recommendation: MRI guided biopsy.\par BI-RADS Category 4: Suspicious\par \par On 6/22/2021, bilateral stereotactic biopsy was performed and pathology was benign in both breasts. \par \par On 7/14/2021, She underwent a wide local excision, right breast at 12 o'clock, superior middle 1/3. She was found to have DCIS, G2, ER positive / ME negative. Surgical margins were very close: 0.2mm. She had a re-excision on 8/13/2021 and there was no residual disease. \par \par Prelude Dx (7/14/2021) DCISonRT score results shows 3 (0-10 scale).\par \par She has completed RT on 11/1/21\par \par \par She has been doing well since surgery.    Patient denies any new palpable breast lumps or pain, denies skin changes, denies nipple discharge.  Patient denies cough, shortness of breath, denies fever, denies bone pain.\par \par Menstrual Hx: The patient is in surgical menopause. age at menarche was 16. age at menopause was 45. \par Past Gyn Surgeries: partial hysterectomy 2016. \par Prior pregnancies: G3 and P3 . Age at live birth: 19 \par Birth Control Pill Use: yes, stopped in 1997. \par Breast Feeding History: yes for 6 months \par \par \par  [de-identified] : DCIS [de-identified] : 8/16/22\par Patient is here to follow up for breast cancer.\par She is on Tamoxifen 20 mg daily since 11/2021, compliant and tolerating well.\par She denies any breast mass/pain, skin changes or nipple discharge.\par She has appt with GI on Friday, 8/1/22.\par \par 3/6/23\par Pt is here for follow up.\par Compliant with Tamoxifen, has hot flashes off and on.\par Had colonoscopy last year

## 2023-03-06 NOTE — PHYSICAL EXAM
[Fully active, able to carry on all pre-disease performance without restriction] : Status 0 - Fully active, able to carry on all pre-disease performance without restriction [Normal] : bilateral breasts without nipple retraction, skin dimpling or palpable masses; the bilateral axillae are without adenopathy [de-identified] : surgical incision on right breast healed well; bilateral breast exam unrevealing

## 2023-03-06 NOTE — ASSESSMENT
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 She is s/p breast conserving surgery and adjuvant RT. \par \par Pt became menopausal after hysterectomy for fibroids. Ovaries were not removed.\par \par We had a detailed discussion regarding her diagnosis and therapeutic options. \par The natural history and prognosis of DCIS was discussed at length.\par She has already completed local treatment without any serious complications.\par She was recommended adjuvant endocrine therapy with Tamoxifen 20 mg daily X 5 years and she agreed. \par \par RECOMMENDATIONS: \par Previous notes reviewed and all relevant radiology results discussed with and were communicated to the patients.\par \par -- Continue Tamoxifen 20 mg daily, \par Side effects of Tamoxifen including hot flashes, menstrual irregularities, weight gain, mood changes, DVT, cataracts  were discussed.\par -- Plan for follow up including need for imaging Rt breast with mammogram Q 6 months X 2 years was discussed.\par -- Due for right dx mammogram, in 9/23\par -- Encouraged a healthy life style with regular exercise, healthy diet.\par -- Follow up with Breast Surgeon, Rad/Onc, PCP, gyne and GI for health maintenance.\par \par All her concerns were addressed during the visit.\par RT in 6 months.\par \par \par

## 2023-03-07 DIAGNOSIS — D05.10 INTRADUCTAL CARCINOMA IN SITU OF UNSPECIFIED BREAST: ICD-10-CM

## 2023-03-09 DIAGNOSIS — D05.11 INTRADUCTAL CARCINOMA IN SITU OF RIGHT BREAST: ICD-10-CM

## 2023-03-24 ENCOUNTER — APPOINTMENT (OUTPATIENT)
Dept: GASTROENTEROLOGY | Facility: CLINIC | Age: 53
End: 2023-03-24
Payer: COMMERCIAL

## 2023-03-24 DIAGNOSIS — R12 HEARTBURN: ICD-10-CM

## 2023-03-24 PROCEDURE — 99202 OFFICE O/P NEW SF 15 MIN: CPT | Mod: 95

## 2023-03-24 NOTE — REVIEW OF SYSTEMS
[As Noted in HPI] : as noted in HPI [Pelvic Pain] : pelvic pain [Negative] : Neurological [Fever] : no fever [Chills] : no chills [Sore Throat] : no sore throat [Hoarseness] : no hoarseness [Chest Pain] : no chest pain [Palpitations] : no palpitations [Cough] : no cough [SOB on Exertion] : no shortness of breath during exertion [Abdominal Pain] : no abdominal pain [Constipation] : no constipation [Heartburn] : no heartburn [Bleeding] : no bleeding [Swollen Glands] : no swollen glands [Vomiting] : no vomiting [Diarrhea] : no diarrhea

## 2023-03-24 NOTE — HISTORY OF PRESENT ILLNESS
[Home] : at home, [unfilled] , at the time of the visit. [Medical Office: ([facility])___] : at the medical office located in  [Verbal consent obtained from patient] : the patient, [unfilled] [FreeTextEntry4] : NICK SMITH [FreeTextEntry1] : 9/21/22 [de-identified] : 9/7/22: no acute pathology [de-identified] : 53yo female h/o breast ca s/p lumpectomy on tamoxifen, ovarian cyst, hysterectomy presents for follow up of heartburn and abdominal pain after EGD and colonoscopy\par \par EGD performed on 9/21/22 revealing mild chronic gastritis, no evidence of H pylori or celiac disease\par Colonoscopy revealing internal hemorrhoids, no adenomas identified, fair prep in right colon.\par \par Pt reports feeling well, previously reported intermittent LLQ pain and nausea, states this has resolved, previously had CT abd/pelvis which was unremarkable, seen by gyn ovarian cyst appears to have resolved. Denies further nausea or heartburn. not taking pepcid. Denies epigastric pain. Reports regular bm, no blood in stools. Eats plenty of salads. Appetite ok. Denies weight loss. States she has been taking iron intermittently for few years, recently resumed states she was feeling tired. Did not do labs as requested. \par \par Reports prior colonoscopy 5-6 years ago findings unclear\par No known family h/o colon ca\par \par Labs reviewed (8/2022):\par Hb 11.3, mcv normal\par LFTs normal\par Ferritin 216, %sat 32\par \par

## 2023-03-24 NOTE — ASSESSMENT
[FreeTextEntry1] : 53yo female h/o breast ca s/p lumpectomy on tamoxifen, ovarian cyst, hysterectomy presents for follow up of heartburn and abdominal pain since resolved s/p EGD performed on 9/21/22 revealing mild chronic gastritis, no evidence of H pylori or celiac disease. Colonoscopy revealing internal hemorrhoids, no adenomas identified, fair prep in right colon. CT abd/pelvis unremarkable. No evidence of iron deficiency on recent labs.\par \par 1. LLQ pain- resolved\par 2. Heartburn - resolved\par 3. Anemia\par \par -Update cbc, iron studies/ferritin as previously requested\par -Encouraged again adequate hydration 6-8 glasses water daily\par -Discussed moderating fiber intake\par -Pepcid 20mg qhs/prn\par -Antireflux measures including elevating hob and to avoid lying down for 2-3 hours after meals\par -Follow up with gyn for LLQ/pelvic pain, does not appear to be GI related\par -Repeat colonoscopy in 2025, sooner if new symptoms develop\par \par Follow up 4-6 months, or as needed\par Pt agreeable with plan, advised to contact us if questions/concerns in the interim\par

## 2023-05-23 ENCOUNTER — EMERGENCY (EMERGENCY)
Facility: HOSPITAL | Age: 53
LOS: 0 days | Discharge: HOME | End: 2023-05-23
Attending: EMERGENCY MEDICINE
Payer: COMMERCIAL

## 2023-05-23 VITALS
TEMPERATURE: 99 F | HEIGHT: 65 IN | HEART RATE: 83 BPM | WEIGHT: 139.99 LBS | OXYGEN SATURATION: 100 % | DIASTOLIC BLOOD PRESSURE: 87 MMHG | SYSTOLIC BLOOD PRESSURE: 117 MMHG | RESPIRATION RATE: 17 BRPM

## 2023-05-23 DIAGNOSIS — Z98.890 OTHER SPECIFIED POSTPROCEDURAL STATES: Chronic | ICD-10-CM

## 2023-05-23 DIAGNOSIS — Z90.711 ACQUIRED ABSENCE OF UTERUS WITH REMAINING CERVICAL STUMP: Chronic | ICD-10-CM

## 2023-05-23 DIAGNOSIS — R55 SYNCOPE AND COLLAPSE: ICD-10-CM

## 2023-05-23 DIAGNOSIS — C50.919 MALIGNANT NEOPLASM OF UNSPECIFIED SITE OF UNSPECIFIED FEMALE BREAST: ICD-10-CM

## 2023-05-23 DIAGNOSIS — M79.604 PAIN IN RIGHT LEG: ICD-10-CM

## 2023-05-23 DIAGNOSIS — M79.605 PAIN IN LEFT LEG: ICD-10-CM

## 2023-05-23 LAB
ALBUMIN SERPL ELPH-MCNC: 4 G/DL — SIGNIFICANT CHANGE UP (ref 3.5–5.2)
ALP SERPL-CCNC: 46 U/L — SIGNIFICANT CHANGE UP (ref 30–115)
ALT FLD-CCNC: 16 U/L — SIGNIFICANT CHANGE UP (ref 0–41)
ANION GAP SERPL CALC-SCNC: 7 MMOL/L — SIGNIFICANT CHANGE UP (ref 7–14)
AST SERPL-CCNC: 20 U/L — SIGNIFICANT CHANGE UP (ref 0–41)
BASOPHILS # BLD AUTO: 0.03 K/UL — SIGNIFICANT CHANGE UP (ref 0–0.2)
BASOPHILS NFR BLD AUTO: 0.7 % — SIGNIFICANT CHANGE UP (ref 0–1)
BILIRUB SERPL-MCNC: 0.3 MG/DL — SIGNIFICANT CHANGE UP (ref 0.2–1.2)
BUN SERPL-MCNC: 13 MG/DL — SIGNIFICANT CHANGE UP (ref 10–20)
CALCIUM SERPL-MCNC: 9.1 MG/DL — SIGNIFICANT CHANGE UP (ref 8.4–10.5)
CHLORIDE SERPL-SCNC: 105 MMOL/L — SIGNIFICANT CHANGE UP (ref 98–110)
CK SERPL-CCNC: 267 U/L — HIGH (ref 0–225)
CO2 SERPL-SCNC: 27 MMOL/L — SIGNIFICANT CHANGE UP (ref 17–32)
CREAT SERPL-MCNC: 0.7 MG/DL — SIGNIFICANT CHANGE UP (ref 0.7–1.5)
D DIMER BLD IA.RAPID-MCNC: <150 NG/ML DDU — SIGNIFICANT CHANGE UP
EGFR: 105 ML/MIN/1.73M2 — SIGNIFICANT CHANGE UP
EOSINOPHIL # BLD AUTO: 0.1 K/UL — SIGNIFICANT CHANGE UP (ref 0–0.7)
EOSINOPHIL NFR BLD AUTO: 2.3 % — SIGNIFICANT CHANGE UP (ref 0–8)
GLUCOSE SERPL-MCNC: 91 MG/DL — SIGNIFICANT CHANGE UP (ref 70–99)
HCT VFR BLD CALC: 35.7 % — LOW (ref 37–47)
HGB BLD-MCNC: 11.3 G/DL — LOW (ref 12–16)
IMM GRANULOCYTES NFR BLD AUTO: 0.2 % — SIGNIFICANT CHANGE UP (ref 0.1–0.3)
LYMPHOCYTES # BLD AUTO: 1.82 K/UL — SIGNIFICANT CHANGE UP (ref 1.2–3.4)
LYMPHOCYTES # BLD AUTO: 42.1 % — SIGNIFICANT CHANGE UP (ref 20.5–51.1)
MCHC RBC-ENTMCNC: 25.6 PG — LOW (ref 27–31)
MCHC RBC-ENTMCNC: 31.7 G/DL — LOW (ref 32–37)
MCV RBC AUTO: 80.8 FL — LOW (ref 81–99)
MONOCYTES # BLD AUTO: 0.36 K/UL — SIGNIFICANT CHANGE UP (ref 0.1–0.6)
MONOCYTES NFR BLD AUTO: 8.3 % — SIGNIFICANT CHANGE UP (ref 1.7–9.3)
NEUTROPHILS # BLD AUTO: 2 K/UL — SIGNIFICANT CHANGE UP (ref 1.4–6.5)
NEUTROPHILS NFR BLD AUTO: 46.4 % — SIGNIFICANT CHANGE UP (ref 42.2–75.2)
NRBC # BLD: 0 /100 WBCS — SIGNIFICANT CHANGE UP (ref 0–0)
PLATELET # BLD AUTO: 190 K/UL — SIGNIFICANT CHANGE UP (ref 130–400)
PMV BLD: 11.3 FL — HIGH (ref 7.4–10.4)
POTASSIUM SERPL-MCNC: 4.4 MMOL/L — SIGNIFICANT CHANGE UP (ref 3.5–5)
POTASSIUM SERPL-SCNC: 4.4 MMOL/L — SIGNIFICANT CHANGE UP (ref 3.5–5)
PROT SERPL-MCNC: 6.7 G/DL — SIGNIFICANT CHANGE UP (ref 6–8)
RBC # BLD: 4.42 M/UL — SIGNIFICANT CHANGE UP (ref 4.2–5.4)
RBC # FLD: 13.1 % — SIGNIFICANT CHANGE UP (ref 11.5–14.5)
SODIUM SERPL-SCNC: 139 MMOL/L — SIGNIFICANT CHANGE UP (ref 135–146)
TROPONIN T SERPL-MCNC: <0.01 NG/ML — SIGNIFICANT CHANGE UP
WBC # BLD: 4.32 K/UL — LOW (ref 4.8–10.8)
WBC # FLD AUTO: 4.32 K/UL — LOW (ref 4.8–10.8)

## 2023-05-23 PROCEDURE — 99285 EMERGENCY DEPT VISIT HI MDM: CPT

## 2023-05-23 PROCEDURE — 71046 X-RAY EXAM CHEST 2 VIEWS: CPT

## 2023-05-23 PROCEDURE — 85025 COMPLETE CBC W/AUTO DIFF WBC: CPT

## 2023-05-23 PROCEDURE — 93010 ELECTROCARDIOGRAM REPORT: CPT

## 2023-05-23 PROCEDURE — 82550 ASSAY OF CK (CPK): CPT

## 2023-05-23 PROCEDURE — 36415 COLL VENOUS BLD VENIPUNCTURE: CPT

## 2023-05-23 PROCEDURE — 80053 COMPREHEN METABOLIC PANEL: CPT

## 2023-05-23 PROCEDURE — 84484 ASSAY OF TROPONIN QUANT: CPT

## 2023-05-23 PROCEDURE — 93005 ELECTROCARDIOGRAM TRACING: CPT

## 2023-05-23 PROCEDURE — 99285 EMERGENCY DEPT VISIT HI MDM: CPT | Mod: 25

## 2023-05-23 PROCEDURE — 71046 X-RAY EXAM CHEST 2 VIEWS: CPT | Mod: 26

## 2023-05-23 PROCEDURE — 85379 FIBRIN DEGRADATION QUANT: CPT

## 2023-05-23 RX ORDER — SODIUM CHLORIDE 9 MG/ML
1000 INJECTION, SOLUTION INTRAVENOUS ONCE
Refills: 0 | Status: COMPLETED | OUTPATIENT
Start: 2023-05-23 | End: 2023-05-23

## 2023-05-23 RX ADMIN — SODIUM CHLORIDE 1000 MILLILITER(S): 9 INJECTION, SOLUTION INTRAVENOUS at 10:44

## 2023-05-23 NOTE — ED ADULT NURSE NOTE - NSFALLUNIVINTERV_ED_ALL_ED
Bed/Stretcher in lowest position, wheels locked, appropriate side rails in place/Call bell, personal items and telephone in reach/Instruct patient to call for assistance before getting out of bed/chair/stretcher/Non-slip footwear applied when patient is off stretcher/[place] to call system/Physically safe environment - no spills, clutter or unnecessary equipment/Purposeful proactive rounding/Room/bathroom lighting operational, light cord in reach

## 2023-05-23 NOTE — ED ADULT NURSE NOTE - OBJECTIVE STATEMENT
pt came in c/o syncope episode that happened yesterday no HT.  caught her before she hit ground. pt states she still does not feel well and has been having lower leg pain since yesterday.

## 2023-05-23 NOTE — ED ADULT TRIAGE NOTE - CHIEF COMPLAINT QUOTE
"both of my legs cramped up yesterday and I passed out" -ht. pt still c/o bl le pain and lightheadedness

## 2023-05-23 NOTE — ED PROVIDER NOTE - CLINICAL SUMMARY MEDICAL DECISION MAKING FREE TEXT BOX
Syncope.  No focal neurological deficits.  Cramps.  Labs and imaging were ordered and reviewed by me.  EKG was ordered and reviewed by me.    Escalation to admission/observation was considered.  However patient feels much better and is comfortable with discharge.  Appropriate follow-up was arranged.

## 2023-05-23 NOTE — ED ADULT TRIAGE NOTE - HEIGHT IN CM
Medication refill received from [facility] for mutliple medications.    LOV: 09/09/21  NOV: 12/01/22  Last Labs:   Last refilled: 02/14/22 #90 w 1     Medications refilled per protocol         165.1

## 2023-05-23 NOTE — ED PROVIDER NOTE - PHYSICAL EXAMINATION
CONSTITUTIONAL: Well-developed; well-nourished; in no acute distress.   SKIN: warm, dry  HEAD: Normocephalic; atraumatic.  EYES: PERRL, EOMI, no conjunctival erythema  NECK: Supple; non tender.  CARD: S1, S2 normal; Regular rate and rhythm.   RESP: No wheezes, rales or rhonchi.  ABD: soft ntnd  EXT: Normal ROM.  No clubbing, cyanosis or edema. FROM of all extremities. no leg swelling or tenderness  NEURO: Alert, oriented, grossly unremarkable  PSYCH: Cooperative, appropriate.

## 2023-05-23 NOTE — ED PROVIDER NOTE - ATTENDING CONTRIBUTION TO CARE
52-year-old female history of BRCA on tamoxifen now presents with syncopal event yesterday while getting out of bed she felt cramps in her legs and passed out did not hit her head.  Currently no symptoms.  No shortness of breath no vomiting no recent travel or surgeries.  No chest pain.  No abdominal pain.    vss, nontoxic, well appearing, pink conj, anicteric, MMM, neck supple, CTAB, RRR, equal radial pulses bilat, abd soft/nt/nd, no cva tend. no calves tend, no edema, no fnd. no rashes.

## 2023-05-23 NOTE — ED PROVIDER NOTE - PATIENT PORTAL LINK FT
You can access the FollowMyHealth Patient Portal offered by [facility] by registering at the following website: [email or web address]. By joining [facility]’s FollowMyHealth portal, you will also be able to view your health information using other applications (apps) compatible with our system.

## 2023-05-23 NOTE — ED PROVIDER NOTE - CARE PROVIDER_API CALL
Garrett Orantes (MD)  Cardiovascular Disease; Internal Medicine; Interventional Cardiology  [address] [place] [address]  Phone: [phone]  Fax: [phone]  Follow Up Time: 1-3 Days

## 2023-05-23 NOTE — ED PROVIDER NOTE - OBJECTIVE STATEMENT
51 yo female, PMHx of breast cancer on tamoxifen, presenting with a syncopal episode yesterday. She states she was getting out of bed during the day when she felt her legs cramp and she syncopized but was able to lower herself onto the ground first. Currently asymptomatic. Denies fevers, chills, chest pain, nausea, vomiting, abdominal pain, headache, dizziness, leg swelling, recent long travels or surgeries, family history of cardiac disease.

## 2023-05-24 NOTE — CHART NOTE - NSCHARTNOTEFT_GEN_A_CORE
cardiology appt scheduled for 6/14/2023 with Dr. sherman Lagunas [facility]. Pt aware of appt details.

## 2023-06-14 ENCOUNTER — APPOINTMENT (OUTPATIENT)
Dept: CARDIOLOGY | Facility: CLINIC | Age: 53
End: 2023-06-14

## 2023-08-28 ENCOUNTER — APPOINTMENT (OUTPATIENT)
Dept: GASTROENTEROLOGY | Facility: CLINIC | Age: 53
End: 2023-08-28

## 2023-09-06 ENCOUNTER — RESULT REVIEW (OUTPATIENT)
Age: 53
End: 2023-09-06

## 2023-09-06 ENCOUNTER — OUTPATIENT (OUTPATIENT)
Dept: OUTPATIENT SERVICES | Facility: HOSPITAL | Age: 53
LOS: 1 days | End: 2023-09-06
Payer: COMMERCIAL

## 2023-09-06 DIAGNOSIS — Z98.890 OTHER SPECIFIED POSTPROCEDURAL STATES: Chronic | ICD-10-CM

## 2023-09-06 DIAGNOSIS — R92.8 OTHER ABNORMAL AND INCONCLUSIVE FINDINGS ON DIAGNOSTIC IMAGING OF BREAST: ICD-10-CM

## 2023-09-06 DIAGNOSIS — Z12.31 ENCOUNTER FOR SCREENING MAMMOGRAM FOR MALIGNANT NEOPLASM OF BREAST: ICD-10-CM

## 2023-09-06 DIAGNOSIS — Z90.711 ACQUIRED ABSENCE OF UTERUS WITH REMAINING CERVICAL STUMP: Chronic | ICD-10-CM

## 2023-09-06 DIAGNOSIS — N64.4 MASTODYNIA: ICD-10-CM

## 2023-09-06 PROCEDURE — G0279: CPT | Mod: 26

## 2023-09-06 PROCEDURE — 76641 ULTRASOUND BREAST COMPLETE: CPT | Mod: 26,50

## 2023-09-06 PROCEDURE — 76641 ULTRASOUND BREAST COMPLETE: CPT | Mod: 50

## 2023-09-06 PROCEDURE — 77066 DX MAMMO INCL CAD BI: CPT

## 2023-09-06 PROCEDURE — 77066 DX MAMMO INCL CAD BI: CPT | Mod: 26

## 2023-09-06 PROCEDURE — G0279: CPT

## 2023-09-07 ENCOUNTER — NON-APPOINTMENT (OUTPATIENT)
Age: 53
End: 2023-09-07

## 2023-09-07 DIAGNOSIS — N64.4 MASTODYNIA: ICD-10-CM

## 2023-09-07 DIAGNOSIS — R92.8 OTHER ABNORMAL AND INCONCLUSIVE FINDINGS ON DIAGNOSTIC IMAGING OF BREAST: ICD-10-CM

## 2023-09-18 ENCOUNTER — OUTPATIENT (OUTPATIENT)
Dept: OUTPATIENT SERVICES | Facility: HOSPITAL | Age: 53
LOS: 1 days | End: 2023-09-18
Payer: COMMERCIAL

## 2023-09-18 ENCOUNTER — APPOINTMENT (OUTPATIENT)
Dept: HEMATOLOGY ONCOLOGY | Facility: CLINIC | Age: 53
End: 2023-09-18
Payer: COMMERCIAL

## 2023-09-18 VITALS
SYSTOLIC BLOOD PRESSURE: 182 MMHG | WEIGHT: 148 LBS | RESPIRATION RATE: 14 BRPM | DIASTOLIC BLOOD PRESSURE: 76 MMHG | HEART RATE: 80 BPM | BODY MASS INDEX: 24.66 KG/M2 | TEMPERATURE: 97.2 F | HEIGHT: 65 IN

## 2023-09-18 DIAGNOSIS — Z98.890 OTHER SPECIFIED POSTPROCEDURAL STATES: Chronic | ICD-10-CM

## 2023-09-18 DIAGNOSIS — D05.11 INTRADUCTAL CARCINOMA IN SITU OF RIGHT BREAST: ICD-10-CM

## 2023-09-18 DIAGNOSIS — D05.10 INTRADUCTAL CARCINOMA IN SITU OF UNSPECIFIED BREAST: ICD-10-CM

## 2023-09-18 DIAGNOSIS — Z90.711 ACQUIRED ABSENCE OF UTERUS WITH REMAINING CERVICAL STUMP: Chronic | ICD-10-CM

## 2023-09-18 PROCEDURE — 99214 OFFICE O/P EST MOD 30 MIN: CPT

## 2023-11-10 NOTE — REASON FOR VISIT
Nephrology [Routine On-Treatment] : a routine on-treatment visit for [Breast Cancer] : breast cancer

## 2023-12-07 ENCOUNTER — APPOINTMENT (OUTPATIENT)
Dept: OBGYN | Facility: CLINIC | Age: 53
End: 2023-12-07
Payer: COMMERCIAL

## 2023-12-07 VITALS
WEIGHT: 140 LBS | BODY MASS INDEX: 23.32 KG/M2 | HEIGHT: 65 IN | DIASTOLIC BLOOD PRESSURE: 80 MMHG | SYSTOLIC BLOOD PRESSURE: 140 MMHG

## 2023-12-07 DIAGNOSIS — N30.00 ACUTE CYSTITIS W/OUT HEMATURIA: ICD-10-CM

## 2023-12-07 DIAGNOSIS — Z01.419 ENCOUNTER FOR GYNECOLOGICAL EXAMINATION (GENERAL) (ROUTINE) W/OUT ABNORMAL FINDINGS: ICD-10-CM

## 2023-12-07 DIAGNOSIS — R10.2 PELVIC AND PERINEAL PAIN: ICD-10-CM

## 2023-12-07 PROCEDURE — 99396 PREV VISIT EST AGE 40-64: CPT | Mod: 25

## 2023-12-07 PROCEDURE — 76830 TRANSVAGINAL US NON-OB: CPT

## 2023-12-07 RX ORDER — SULFAMETHOXAZOLE AND TRIMETHOPRIM 800; 160 MG/1; MG/1
800-160 TABLET ORAL TWICE DAILY
Qty: 14 | Refills: 0 | Status: ACTIVE | COMMUNITY
Start: 2023-12-07 | End: 1900-01-01

## 2023-12-10 LAB
BACTERIA UR CULT: NORMAL
HPV HIGH+LOW RISK DNA PNL CVX: NOT DETECTED

## 2023-12-14 LAB — CYTOLOGY CVX/VAG DOC THIN PREP: NORMAL

## 2024-01-10 DIAGNOSIS — K59.00 CONSTIPATION, UNSPECIFIED: ICD-10-CM

## 2024-01-10 RX ORDER — POLYETHYLENE GLYCOL 3350 17 G/17G
17 POWDER, FOR SOLUTION ORAL DAILY
Qty: 30 | Refills: 1 | Status: ACTIVE | COMMUNITY
Start: 2024-01-10 | End: 1900-01-01

## 2024-01-10 NOTE — ASU PREOP CHECKLIST - HEIGHT IN FEET
"Nutrition Assessment Note    Nutrition Assessment    Reason for Assessment: Dietitian discretion (CHF)    Reason for Hospital Admission:  Justo Whitehead is a 45 y.o. male who is admitted for multiple symptoms.  The patient states that he recently quit drinking.  He states he stopped drinking alcohol about 4 to 5 days ago.  He is trying to detox at home.  He states that he has been having some generalized malaise and fatigue, diffuse myalgias, intermittent headache, shortness of breath, abdominal pain, diarrhea, and intermittent nausea since then. Pt is in COVID isolation.     Attempted to reach Pt via phone, no answer.    Nutrition History:  Food and Nutrient History: NA     Food Allergies/Intolerances:  None  GI Symptoms: Diarrhea, Nausea    Anthropometrics:  Ht: 180.3 cm (5' 11\"), Wt: 118 kg (260 lb 2.3 oz), BMI: 36.3  IBW/kg (Dietitian Calculated): 78.18 kg  Percent of IBW: 150.93 %  Adjusted Body Weight (kg): 88.18 kg    Weight Change:   Unable to assess             Nutrition Focused Physical Exam Findings: defer: COVID pt                      Nutrition Significant Labs:  Lab Results   Component Value Date    WBC 5.1 01/09/2024    HGB 11.8 (L) 01/09/2024    HCT 34.3 (L) 01/09/2024    PLT 77 (L) 01/09/2024    CHOL 169 09/13/2023    TRIG 38 (L) 09/13/2023    HDL 81 09/13/2023     (H) 01/08/2024    AST 75 (H) 01/08/2024     01/10/2024    K 3.2 (L) 01/10/2024     01/10/2024    CREATININE 0.80 01/10/2024    BUN 16 01/10/2024    CO2 26 01/10/2024    TSH 3.22 01/10/2024    HGBA1C 5.7 09/03/2021       Current Facility-Administered Medications:     acetaminophen (Tylenol) tablet 650 mg, 650 mg, oral, q4h PRN, 650 mg at 01/10/24 0019 **OR** acetaminophen (Tylenol) oral liquid 650 mg, 650 mg, oral, q4h PRN **OR** acetaminophen (Tylenol) suppository 650 mg, 650 mg, rectal, q4h PRN, Oludamilola O Ogunlesi, MD    carvedilol (Coreg) tablet 12.5 mg, 12.5 mg, oral, BID with meals, Jaspreet Robertson, " MD, 12.5 mg at 01/10/24 0912    citalopram (CeleXA) tablet 10 mg, 10 mg, oral, Daily, Jaspreet Robertson MD, 10 mg at 01/10/24 0918    dilTIAZem (Cardizem) 125 mg in dextrose 5% 125 mL (1 mg/mL) infusion (premix), 5-15 mg/hr, intravenous, Continuous, Jaspreet Robertson MD, Last Rate: 5 mL/hr at 01/10/24 0934, 5 mg/hr at 01/10/24 0934    folic acid (Folvite) tablet 1 mg, 1 mg, oral, Daily, Jaspreet Robertson MD, 1 mg at 01/10/24 0918    lisinopril tablet 20 mg, 20 mg, oral, Daily, Jaspreet Robertson MD, 20 mg at 01/10/24 0918    LORazepam (Ativan) injection 0.5 mg, 0.5 mg, intravenous, q2h PRN **OR** LORazepam (Ativan) injection 1 mg, 1 mg, intravenous, q2h PRN **OR** LORazepam (Ativan) injection 2 mg, 2 mg, intravenous, q2h PRN, Jaspreet Robertson MD    melatonin tablet 3 mg, 3 mg, oral, Nightly PRN, Jaspreet Robertson MD    multivitamin with minerals 1 tablet, 1 tablet, oral, Daily, aJspreet Robertson MD, 1 tablet at 01/10/24 0918    perflutren lipid microspheres (Definity) injection 0.5-10 mL of dilution, 0.5-10 mL of dilution, intravenous, Once in imaging, Jaspreet Robertson MD    perflutren protein A microsphere (Optison) injection 0.5 mL, 0.5 mL, intravenous, Once in imaging, Jaspreet Robertson MD    polyethylene glycol (Glycolax, Miralax) packet 17 g, 17 g, oral, Daily PRN, Jaspreet Robertson MD    sulfur hexafluoride microsphr (Lumason) injection 24.28 mg, 2 mL, intravenous, Once in imaging, Jaspreet Robertson MD    [START ON 1/11/2024] thiamine (Vitamin B-1) tablet 100 mg, 100 mg, oral, Daily, Jaspreet Robertson MD    Dietary Orders (From admission, onward)       Start     Ordered    01/09/24 2318  May Participate in Room Service  Once        Question:  .  Answer:  Yes    01/09/24 2317    01/08/24 1752  Adult diet 2-3 grams sodium  Diet effective now        Question:  Diet type  Answer:  2-3 grams sodium    01/08/24 1751                  Estimated  Needs:   Estimated Energy Needs  Total Energy Estimated Needs (kCal): 2205 kCal  Total Estimated Energy Need per Day (kCal/kg): 25 kCal/kg  Method for Estimating Needs: ABW    Estimated Protein Needs  Total Protein Estimated Needs (g): 88 g  Total Protein Estimated Needs (g/kg): 1 g/kg  Method for Estimating Needs: ABW    Estimated Fluid Needs  Total Fluid Estimated Needs (mL): 2205 mL  Method for Estimating Needs: 1 mL/kcal        Nutrition Diagnosis   Nutrition Diagnosis:       Nutrition Diagnosis  Patient has Nutrition Diagnosis: No       Nutrition Interventions/Recommendations   Nutrition Interventions and Recommendations:    Nutrition Prescription:  Individualized Nutrition Prescription Provided for : 2205 kcals, 88 gm protein via 2-3 gm sodium restricted diet    Nutrition Interventions:   Food and/or Nutrient Delivery Interventions  Interventions: Meals and snacks  Meals and Snacks: [place]-modified diet  Goal: Provide diet as ordered  Additional Interventions: Can provide education at follow-up if needed    Education Documentation  No documentation found.           Nutrition Monitoring and Evaluation   Monitoring/Evaluation:   Food/Nutrient Related History Monitoring  Monitoring and Evaluation Plan: Energy intake  Energy Intake: Estimated energy intake  Criteria: Pt to consume >/= 75% of estimated needs            Time Spent/Follow-up:   Follow Up  Time Spent (min): 20 minutes  Last Date of Nutrition Visit: 01/10/24  Nutrition Follow-Up Needed?: 5-7 days  Follow up Comment: 1/15/24   5

## 2024-02-16 ENCOUNTER — APPOINTMENT (OUTPATIENT)
Dept: GASTROENTEROLOGY | Facility: CLINIC | Age: 54
End: 2024-02-16
Payer: COMMERCIAL

## 2024-02-16 DIAGNOSIS — R10.32 LEFT LOWER QUADRANT PAIN: ICD-10-CM

## 2024-02-16 DIAGNOSIS — R11.0 NAUSEA: ICD-10-CM

## 2024-02-16 PROCEDURE — 99213 OFFICE O/P EST LOW 20 MIN: CPT

## 2024-02-16 RX ORDER — FAMOTIDINE 20 MG/1
20 TABLET, FILM COATED ORAL
Qty: 30 | Refills: 2 | Status: ACTIVE | COMMUNITY
Start: 2024-02-16 | End: 1900-01-01

## 2024-02-16 NOTE — REVIEW OF SYSTEMS
[As Noted in HPI] : as noted in HPI [Pelvic Pain] : pelvic pain [Negative] : Musculoskeletal [Fever] : no fever [Chills] : no chills [Recent Weight Loss (___ Lbs)] : no recent weight loss [Sore Throat] : no sore throat [Hoarseness] : no hoarseness [Chest Pain] : no chest pain [Palpitations] : no palpitations [Cough] : no cough [SOB on Exertion] : no shortness of breath during exertion [Abdominal Pain] : no abdominal pain [Constipation] : no constipation [Heartburn] : no heartburn [Swollen Glands] : no swollen glands [Vomiting] : no vomiting [Diarrhea] : no diarrhea

## 2024-02-16 NOTE — HISTORY OF PRESENT ILLNESS
[Home] : at home, [unfilled] , at the time of the visit. [Medical Office: ([facility])___] : at the medical office located in  [Verbal consent obtained from patient] : the patient, [unfilled] [FreeTextEntry4] : NICK SMITH [FreeTextEntry1] : 9/21/22 [de-identified] : 9/7/22: no acute pathology [de-identified] : 54yo female h/o breast ca s/p lumpectomy on tamoxifen, ovarian cyst, hysterectomy presents for follow up of heartburn and abdominal pain s/p EGD and colonoscopy  EGD performed on 9/21/22 revealing mild chronic gastritis, no evidence of H pylori or celiac disease Colonoscopy revealing internal hemorrhoids, no adenomas identified, fair prep in right colon.  Pt reporting intermittent LLQ pain and nausea, improved at prior visit now with recurrent mild symptoms, not taking pepcid recently seemed to help her previously. Previously had CT abd/pelvis which was unremarkable, Seen by gyn ovarian cyst appears to have resolved.  Denies heartburn or epigastric pain. Reports regular bm daily though sometimes incomplete emptying sensation, advised miralax previously ordered last month, however pt did not , no blood in stools. Eats plenty of salads. Appetite ok. Denies weight loss. States she has been taking iron intermittently for few years, recently resumed states she was feeling tired. Still did not do labs as requested.   Reports prior colonoscopy 5-6 years ago findings unclear No known family h/o colon ca  Labs reviewed (8/2022): Hb 11.3, mcv normal LFTs normal Ferritin 216, %sat 32

## 2024-02-16 NOTE — ASSESSMENT
[FreeTextEntry1] : 52yo female h/o breast ca s/p lumpectomy on tamoxifen, ovarian cyst, hysterectomy presents for follow up of intermittent nausea and abdominal pain s/p EGD performed on 9/21/22 revealing mild chronic gastritis, no evidence of H pylori or celiac disease. Colonoscopy revealing internal hemorrhoids, no adenomas identified, fair prep in right colon. CT abd/pelvis unremarkable. No evidence of iron deficiency on recent labs.  1. LLQ pain 2. Nausea 3. Anemia  -Update cbc, iron studies/ferritin as previously requested (pt did not yet do labs) -Resume pepcid 20mg qhs/prn -Miralax daily/prn constipation -Encouraged again adequate hydration 6-8 glasses water daily -Discussed moderating fiber intake -Antireflux measures including elevating hob and to avoid lying down for 2-3 hours after meals -Repeat colonoscopy in 2025, sooner if new symptoms develop  Follow up 3 months, or sooner as needed Pt agreeable with plan, advised to contact us if questions/concerns in the interim

## 2024-03-04 ENCOUNTER — OUTPATIENT (OUTPATIENT)
Dept: OUTPATIENT SERVICES | Facility: HOSPITAL | Age: 54
LOS: 1 days | End: 2024-03-04
Payer: COMMERCIAL

## 2024-03-04 ENCOUNTER — APPOINTMENT (OUTPATIENT)
Dept: HEMATOLOGY ONCOLOGY | Facility: CLINIC | Age: 54
End: 2024-03-04
Payer: COMMERCIAL

## 2024-03-04 VITALS
SYSTOLIC BLOOD PRESSURE: 162 MMHG | HEIGHT: 65 IN | DIASTOLIC BLOOD PRESSURE: 72 MMHG | TEMPERATURE: 97.6 F | BODY MASS INDEX: 23.66 KG/M2 | WEIGHT: 142 LBS | HEART RATE: 85 BPM

## 2024-03-04 DIAGNOSIS — Z98.890 OTHER SPECIFIED POSTPROCEDURAL STATES: Chronic | ICD-10-CM

## 2024-03-04 DIAGNOSIS — D05.11 INTRADUCTAL CARCINOMA IN SITU OF RIGHT BREAST: ICD-10-CM

## 2024-03-04 DIAGNOSIS — D64.9 ANEMIA, UNSPECIFIED: ICD-10-CM

## 2024-03-04 DIAGNOSIS — Z51.81 ENCOUNTER FOR THERAPEUTIC DRUG LVL MONITORING: ICD-10-CM

## 2024-03-04 DIAGNOSIS — Z79.810 ENCOUNTER FOR THERAPEUTIC DRUG LVL MONITORING: ICD-10-CM

## 2024-03-04 DIAGNOSIS — Z90.711 ACQUIRED ABSENCE OF UTERUS WITH REMAINING CERVICAL STUMP: Chronic | ICD-10-CM

## 2024-03-04 PROCEDURE — 99214 OFFICE O/P EST MOD 30 MIN: CPT

## 2024-03-04 PROCEDURE — G2211 COMPLEX E/M VISIT ADD ON: CPT

## 2024-03-04 NOTE — HISTORY OF PRESENT ILLNESS
[Disease: _____________________] : Disease: [unfilled] [T: ___] : T[unfilled] [N: ___] : N[unfilled] [M: ___] : M[unfilled] [AJCC Stage: ____] : AJCC Stage: [unfilled] [de-identified] : This is a very pleasant 51 year old woman who is being eval for Rt breast DCIS\par  \par  Work up so far\par  screening mammogram (4/13/2021) :  calcifications in the right breast at 12 o'clock. Diagnostic mammogram and ultrasound on 4/16/2021 showed indeterminate calcifications in the right breast. Stereotactically guided core biopsy recommended. 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 It was ER positive / NH negative.\par  On 6/4/2021, two (2) outside slide for review from [facility] shows ductal carcinoma in-situ (DCIS), solid type with single cell necrosis and calcifications, intermediate nuclear grade. It was ER positive and NH negative. \par  \par  She also had an MRI of bilateral breasts on 6/17/2021, which showed indeterminate areas of abnormal enhancement in each breast. MRI guided biopsy of both breasts (1 site in each breast) is recommended.\par  Biopsy-proven DCIS in the right breast.\par  Recommendation: MRI guided biopsy.\par  BI-RADS Category 4: Suspicious\par  \par  On 6/22/2021, bilateral stereotactic biopsy was performed and pathology was benign in both breasts. \par  \par  On 7/14/2021, She underwent a wide local excision, right breast at 12 o'clock, superior middle 1/3. She was found to have DCIS, G2, ER positive / NH negative. Surgical margins were very close: 0.2mm. She had a re-excision on 8/13/2021 and there was no residual disease. \par  \par  Prelude Dx (7/14/2021) DCISonRT score results shows 3 (0-10 scale).\par  \par  She has completed RT on 11/1/21\par  \par  \par  She has been doing well since surgery.    Patient denies any new palpable breast lumps or pain, denies skin changes, denies nipple discharge.  Patient denies cough, shortness of breath, denies fever, denies bone pain.\par  \par  Menstrual Hx: The patient is in surgical menopause. age at menarche was 16. age at menopause was 45. \par  Past Gyn Surgeries: partial hysterectomy 2016. \par  Prior pregnancies: G3 and P3 . Age at live birth: 19 \par  Birth Control Pill Use: yes, stopped in 1997. \par  Breast Feeding History: yes for 6 months \par  \par  \par   [de-identified] : DCIS [de-identified] : 8/16/22 Patient is here to follow up for breast cancer. She is on Tamoxifen 20 mg daily since 11/2021, compliant and tolerating well. She denies any breast mass/pain, skin changes or nipple discharge. She has appt with GI on Friday, 8/1/22.  3/6/23 Pt is here for follow up. Compliant with Tamoxifen, has hot flashes off and on. Had colonoscopy last year  9/18/23  Patient here for follow up, feeling well.  She denies any new complaints.  Patient denies any new palpable breast lumps or pain, denies skin changes, denies nipple discharge.  Patient denies cough, shortness of breath, denies fever, denies bone pain. compiant with [facility] 3/4/24 Pt is here for follow up. Feels well. No breast related complaints. Has Rt foot pain on walking.

## 2024-03-04 NOTE — ASSESSMENT
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 She is s/p breast conserving surgery and adjuvant RT.   Pt became menopausal after hysterectomy for fibroids. Ovaries were not removed.  We had a detailed discussion regarding her diagnosis and therapeutic options.  The natural history and prognosis of DCIS was discussed at length. She has already completed local treatment without any serious complications. She was recommended adjuvant endocrine therapy with Tamoxifen. She was started on Tamoxifen 20mg. Today she was apprised of the data on low dose Gas[place]. She prefers to go on that regimen  RECOMMENDATIONS:  Previous notes reviewed and all relevant radiology results discussed with and were communicated to the patients.  -- Change  Tamoxifen 5mg daily,  to continue till 11/24) Side effects of Tamoxifen including hot flashes, menstrual irregularities, weight gain, mood changes, DVT, cataracts  were discussed. -- Plan for follow up including need for imaging Rt breast with mammogram Q 6 months X 2 years was discussed. --  mammogram, in 9/23 was BENIGN, next mammo due in 9/24 -- Encouraged a healthy life style with regular exercise, healthy diet. Can see podiatrist for foot pain  All her concerns were addressed during the visit. RT in 6 months.

## 2024-03-04 NOTE — PHYSICAL EXAM
[Fully active, able to carry on all pre-disease performance without restriction] : Status 0 - Fully active, able to carry on all pre-disease performance without restriction [Normal] : bilateral breasts without nipple retraction, skin dimpling or palpable masses; the bilateral axillae are without adenopathy [de-identified] : surgical incision on right breast healed well; bilateral breast exam neg [de-identified] : No tenderness, erythema on Rt foot. No pain on deep palpation

## 2024-03-05 DIAGNOSIS — D64.9 ANEMIA, UNSPECIFIED: ICD-10-CM

## 2024-03-11 ENCOUNTER — APPOINTMENT (OUTPATIENT)
Dept: HEMATOLOGY ONCOLOGY | Facility: CLINIC | Age: 54
End: 2024-03-11

## 2024-04-07 ENCOUNTER — EMERGENCY (EMERGENCY)
Facility: HOSPITAL | Age: 54
LOS: 0 days | Discharge: HOME | End: 2024-04-07
Attending: EMERGENCY MEDICINE
Payer: COMMERCIAL

## 2024-04-07 VITALS
OXYGEN SATURATION: 100 % | HEIGHT: 65 IN | TEMPERATURE: 99 F | WEIGHT: 139.99 LBS | RESPIRATION RATE: 19 BRPM | DIASTOLIC BLOOD PRESSURE: 80 MMHG | HEART RATE: 93 BPM | SYSTOLIC BLOOD PRESSURE: 140 MMHG

## 2024-04-07 DIAGNOSIS — R51.9 HEADACHE, UNSPECIFIED: ICD-10-CM

## 2024-04-07 DIAGNOSIS — Z98.890 OTHER SPECIFIED POSTPROCEDURAL STATES: Chronic | ICD-10-CM

## 2024-04-07 DIAGNOSIS — R19.7 DIARRHEA, UNSPECIFIED: ICD-10-CM

## 2024-04-07 DIAGNOSIS — K52.9 NONINFECTIVE GASTROENTERITIS AND COLITIS, UNSPECIFIED: ICD-10-CM

## 2024-04-07 DIAGNOSIS — Z90.711 ACQUIRED ABSENCE OF UTERUS WITH REMAINING CERVICAL STUMP: Chronic | ICD-10-CM

## 2024-04-07 DIAGNOSIS — R74.8 ABNORMAL LEVELS OF OTHER SERUM ENZYMES: ICD-10-CM

## 2024-04-07 DIAGNOSIS — R10.9 UNSPECIFIED ABDOMINAL PAIN: ICD-10-CM

## 2024-04-07 LAB
ALBUMIN SERPL ELPH-MCNC: 4.2 G/DL — SIGNIFICANT CHANGE UP (ref 3.5–5.2)
ALP SERPL-CCNC: 55 U/L — SIGNIFICANT CHANGE UP (ref 30–115)
ALT FLD-CCNC: 19 U/L — SIGNIFICANT CHANGE UP (ref 0–41)
ANION GAP SERPL CALC-SCNC: 9 MMOL/L — SIGNIFICANT CHANGE UP (ref 7–14)
APPEARANCE UR: CLEAR — SIGNIFICANT CHANGE UP
AST SERPL-CCNC: 20 U/L — SIGNIFICANT CHANGE UP (ref 0–41)
BASOPHILS # BLD AUTO: 0.01 K/UL — SIGNIFICANT CHANGE UP (ref 0–0.2)
BASOPHILS NFR BLD AUTO: 0.1 % — SIGNIFICANT CHANGE UP (ref 0–1)
BILIRUB SERPL-MCNC: 0.3 MG/DL — SIGNIFICANT CHANGE UP (ref 0.2–1.2)
BILIRUB UR-MCNC: NEGATIVE — SIGNIFICANT CHANGE UP
BUN SERPL-MCNC: 18 MG/DL — SIGNIFICANT CHANGE UP (ref 10–20)
CALCIUM SERPL-MCNC: 9.4 MG/DL — SIGNIFICANT CHANGE UP (ref 8.4–10.5)
CHLORIDE SERPL-SCNC: 104 MMOL/L — SIGNIFICANT CHANGE UP (ref 98–110)
CO2 SERPL-SCNC: 25 MMOL/L — SIGNIFICANT CHANGE UP (ref 17–32)
COLOR SPEC: YELLOW — SIGNIFICANT CHANGE UP
CREAT SERPL-MCNC: 0.8 MG/DL — SIGNIFICANT CHANGE UP (ref 0.7–1.5)
DIFF PNL FLD: NEGATIVE — SIGNIFICANT CHANGE UP
EGFR: 89 ML/MIN/1.73M2 — SIGNIFICANT CHANGE UP
EOSINOPHIL # BLD AUTO: 0.04 K/UL — SIGNIFICANT CHANGE UP (ref 0–0.7)
EOSINOPHIL NFR BLD AUTO: 0.6 % — SIGNIFICANT CHANGE UP (ref 0–8)
GLUCOSE SERPL-MCNC: 88 MG/DL — SIGNIFICANT CHANGE UP (ref 70–99)
GLUCOSE UR QL: NEGATIVE MG/DL — SIGNIFICANT CHANGE UP
HCG SERPL QL: NEGATIVE — SIGNIFICANT CHANGE UP
HCT VFR BLD CALC: 36.8 % — LOW (ref 37–47)
HGB BLD-MCNC: 11.8 G/DL — LOW (ref 12–16)
IMM GRANULOCYTES NFR BLD AUTO: 0.3 % — SIGNIFICANT CHANGE UP (ref 0.1–0.3)
KETONES UR-MCNC: NEGATIVE MG/DL — SIGNIFICANT CHANGE UP
LACTATE SERPL-SCNC: 0.7 MMOL/L — SIGNIFICANT CHANGE UP (ref 0.7–2)
LEUKOCYTE ESTERASE UR-ACNC: NEGATIVE — SIGNIFICANT CHANGE UP
LIDOCAIN IGE QN: 360 U/L — HIGH (ref 7–60)
LYMPHOCYTES # BLD AUTO: 1.15 K/UL — LOW (ref 1.2–3.4)
LYMPHOCYTES # BLD AUTO: 16.4 % — LOW (ref 20.5–51.1)
MCHC RBC-ENTMCNC: 25.7 PG — LOW (ref 27–31)
MCHC RBC-ENTMCNC: 32.1 G/DL — SIGNIFICANT CHANGE UP (ref 32–37)
MCV RBC AUTO: 80.2 FL — LOW (ref 81–99)
MONOCYTES # BLD AUTO: 0.47 K/UL — SIGNIFICANT CHANGE UP (ref 0.1–0.6)
MONOCYTES NFR BLD AUTO: 6.7 % — SIGNIFICANT CHANGE UP (ref 1.7–9.3)
NEUTROPHILS # BLD AUTO: 5.32 K/UL — SIGNIFICANT CHANGE UP (ref 1.4–6.5)
NEUTROPHILS NFR BLD AUTO: 75.9 % — HIGH (ref 42.2–75.2)
NITRITE UR-MCNC: NEGATIVE — SIGNIFICANT CHANGE UP
NRBC # BLD: 0 /100 WBCS — SIGNIFICANT CHANGE UP (ref 0–0)
PH UR: 6 — SIGNIFICANT CHANGE UP (ref 5–8)
PLATELET # BLD AUTO: 196 K/UL — SIGNIFICANT CHANGE UP (ref 130–400)
PMV BLD: 11.3 FL — HIGH (ref 7.4–10.4)
POTASSIUM SERPL-MCNC: 4.7 MMOL/L — SIGNIFICANT CHANGE UP (ref 3.5–5)
POTASSIUM SERPL-SCNC: 4.7 MMOL/L — SIGNIFICANT CHANGE UP (ref 3.5–5)
PROT SERPL-MCNC: 7.1 G/DL — SIGNIFICANT CHANGE UP (ref 6–8)
PROT UR-MCNC: NEGATIVE MG/DL — SIGNIFICANT CHANGE UP
RBC # BLD: 4.59 M/UL — SIGNIFICANT CHANGE UP (ref 4.2–5.4)
RBC # FLD: 13.2 % — SIGNIFICANT CHANGE UP (ref 11.5–14.5)
SODIUM SERPL-SCNC: 138 MMOL/L — SIGNIFICANT CHANGE UP (ref 135–146)
SP GR SPEC: 1.01 — SIGNIFICANT CHANGE UP (ref 1–1.03)
UROBILINOGEN FLD QL: 0.2 MG/DL — SIGNIFICANT CHANGE UP (ref 0.2–1)
WBC # BLD: 7.01 K/UL — SIGNIFICANT CHANGE UP (ref 4.8–10.8)
WBC # FLD AUTO: 7.01 K/UL — SIGNIFICANT CHANGE UP (ref 4.8–10.8)

## 2024-04-07 PROCEDURE — 74177 CT ABD & PELVIS W/CONTRAST: CPT | Mod: 26,MC

## 2024-04-07 PROCEDURE — 96375 TX/PRO/DX INJ NEW DRUG ADDON: CPT

## 2024-04-07 PROCEDURE — 74177 CT ABD & PELVIS W/CONTRAST: CPT | Mod: MC

## 2024-04-07 PROCEDURE — 99284 EMERGENCY DEPT VISIT MOD MDM: CPT | Mod: 25

## 2024-04-07 PROCEDURE — 80053 COMPREHEN METABOLIC PANEL: CPT

## 2024-04-07 PROCEDURE — 85025 COMPLETE CBC W/AUTO DIFF WBC: CPT

## 2024-04-07 PROCEDURE — 36415 COLL VENOUS BLD VENIPUNCTURE: CPT

## 2024-04-07 PROCEDURE — 96374 THER/PROPH/DIAG INJ IV PUSH: CPT | Mod: XU

## 2024-04-07 PROCEDURE — 99285 EMERGENCY DEPT VISIT HI MDM: CPT

## 2024-04-07 PROCEDURE — 81003 URINALYSIS AUTO W/O SCOPE: CPT

## 2024-04-07 PROCEDURE — 84703 CHORIONIC GONADOTROPIN ASSAY: CPT

## 2024-04-07 PROCEDURE — 83690 ASSAY OF LIPASE: CPT

## 2024-04-07 PROCEDURE — 83605 ASSAY OF LACTIC ACID: CPT

## 2024-04-07 PROCEDURE — 87086 URINE CULTURE/COLONY COUNT: CPT

## 2024-04-07 RX ORDER — METOCLOPRAMIDE HCL 10 MG
10 TABLET ORAL ONCE
Refills: 0 | Status: COMPLETED | OUTPATIENT
Start: 2024-04-07 | End: 2024-04-07

## 2024-04-07 RX ORDER — ACETAMINOPHEN 500 MG
975 TABLET ORAL ONCE
Refills: 0 | Status: COMPLETED | OUTPATIENT
Start: 2024-04-07 | End: 2024-04-07

## 2024-04-07 RX ORDER — METRONIDAZOLE 500 MG
1 TABLET ORAL
Qty: 21 | Refills: 0
Start: 2024-04-07 | End: 2024-04-13

## 2024-04-07 RX ORDER — CIPROFLOXACIN LACTATE 400MG/40ML
1 VIAL (ML) INTRAVENOUS
Qty: 14 | Refills: 0
Start: 2024-04-07 | End: 2024-04-13

## 2024-04-07 RX ORDER — METRONIDAZOLE 500 MG
500 TABLET ORAL ONCE
Refills: 0 | Status: DISCONTINUED | OUTPATIENT
Start: 2024-04-07 | End: 2024-04-07

## 2024-04-07 RX ORDER — SODIUM CHLORIDE 9 MG/ML
1000 INJECTION, SOLUTION INTRAVENOUS ONCE
Refills: 0 | Status: COMPLETED | OUTPATIENT
Start: 2024-04-07 | End: 2024-04-07

## 2024-04-07 RX ORDER — CIPROFLOXACIN LACTATE 400MG/40ML
400 VIAL (ML) INTRAVENOUS ONCE
Refills: 0 | Status: DISCONTINUED | OUTPATIENT
Start: 2024-04-07 | End: 2024-04-07

## 2024-04-07 RX ORDER — ONDANSETRON 8 MG/1
4 TABLET, FILM COATED ORAL ONCE
Refills: 0 | Status: COMPLETED | OUTPATIENT
Start: 2024-04-07 | End: 2024-04-07

## 2024-04-07 RX ADMIN — SODIUM CHLORIDE 1000 MILLILITER(S): 9 INJECTION, SOLUTION INTRAVENOUS at 07:47

## 2024-04-07 RX ADMIN — Medication 104 MILLIGRAM(S): at 07:47

## 2024-04-07 RX ADMIN — Medication 975 MILLIGRAM(S): at 07:47

## 2024-04-07 RX ADMIN — ONDANSETRON 4 MILLIGRAM(S): 8 TABLET, FILM COATED ORAL at 10:33

## 2024-04-07 NOTE — ED PROVIDER NOTE - PHYSICAL EXAMINATION
VITAL SIGNS: I have reviewed nursing notes and confirm.  CONSTITUTIONAL: Patient is in no acute distress.  SKIN: Skin exam is warm and dry, no acute rash.  HEAD: Normocephalic; atraumatic.  EYES: PERRL, EOM intact; conjunctiva and sclera clear.  ENT: No nasal discharge; airway clear.   NECK: Supple; non tender.  CARD: S1, S2 normal; no murmurs, gallops, or rubs. Regular rate and rhythm.  RESP: Clear to auscultation bilaterally. No wheezes, rales or rhonchi.  ABD: Normal bowel sounds; soft; non-distended; +diffuse tenderness. No rebound tenderness or guarding.   EXT: Normal ROM. No edema.  LYMPH: No acute cervical adenopathy.  NEURO: Alert, oriented. Grossly unremarkable. No focal deficits.  PSYCH: Cooperative, appropriate.

## 2024-04-07 NOTE — ED ADULT TRIAGE NOTE - CHIEF COMPLAINT QUOTE
My stomach since yesterday morning has been cramping. And I have a headache  - patient   Patient reports frequent bowel movements with some loose stools, denies vomiting

## 2024-04-07 NOTE — ED ADULT NURSE NOTE - CHIEF COMPLAINT QUOTE
My stomach since yesterday morning has been cramping. And I have a headache  - patient   Patient reports frequent bowel movements with some loose stools, denies vomiting
Yes

## 2024-04-07 NOTE — ED ADULT NURSE NOTE - OBJECTIVE STATEMENT
patient c/o stomach since yesterday morning has been cramping. States "I have a headache"  - patient   Patient reports frequent bowel movements with some loose stools, denies vomiting

## 2024-04-07 NOTE — ED PROVIDER NOTE - PATIENT PORTAL LINK FT
You can access the FollowMyHealth Patient Portal offered by [facility] by registering at the following website: [email or web address]. By joining [facility]’s FollowMyHealth portal, you will also be able to view your health information using other applications (apps) compatible with our system.

## 2024-04-07 NOTE — ED PROVIDER NOTE - CLINICAL SUMMARY MEDICAL DECISION MAKING FREE TEXT BOX
51-year-old female history of breast cancer parental ration of diffuse abdominal pain as well as loose stools since yesterday.  No fever chills.  Agree with above exam  Impression  Patient with abdominal pain here patient found to have diffuse colitis.  Plan for antibiotics and discharge home.  Of note patient has an elevated lipase likely due to inflammation advised follow-up of this.

## 2024-04-07 NOTE — ED PROVIDER NOTE - NSFOLLOWUPINSTRUCTIONS_ED_ALL_ED_FT
Our Emergency Department Referral Coordinators will be reaching out to you in the next 24-48 hours from 9:00am to 5:00pm with a follow up appointment. Please expect a phone call from the hospital in that time frame. If you do not receive a call or if you have any questions or concerns, you can reach them at   [phone]     Colitis is inflammation of the colon. Colitis may last a short time (acute) or it may last a long time (chronic).     CAUSES  This condition may be caused by:  Viruses.  Bacteria.  Reactions to medicine.  Certain autoimmune diseases, such as Crohn disease or ulcerative colitis.    SYMPTOMS  Symptoms of this condition include:    Diarrhea.  Passing bloody or tarry stool.  Pain.  Fever.  Vomiting.  Tiredness (fatigue).  Weight loss.  Bloating.  Sudden increase in abdominal pain.  Having fewer bowel movements than usual.    DIAGNOSIS  This condition is diagnosed with a stool test or a blood test. You may also have other tests, including X-rays, a CT scan, or a colonoscopy.    TREATMENT  Treatment may include:    Resting the bowel. This involves not eating or drinking for a period of time.  Fluids that are given through an IV tube.  Medicine for pain and diarrhea.  Antibiotic medicines.  Cortisone medicines.  Surgery.    HOME CARE INSTRUCTIONS  Eating and Drinking    Follow instructions from your health care provider about eating or drinking restrictions.  Drink enough fluid to keep your urine clear or pale yellow.  Work with a dietitian to determine which foods cause your condition to flare up.  Avoid foods that cause flare-ups.  Eat a well-balanced diet.    Medicines    Take over-the-counter and prescription medicines only as told by your health care provider.  If you were prescribed an antibiotic medicine, take it as told by your health care provider. Do not stop taking the antibiotic even if you start to feel better.    General Instructions    Keep all follow-up visits as told by your health care provider. This is important.    SEEK MEDICAL CARE IF:  Your symptoms do not go away.  You develop new symptoms.    SEEK IMMEDIATE MEDICAL CARE IF:  You have a fever that does not go away with treatment.  You develop chills.  You have extreme weakness, fainting, or dehydration.  You have repeated vomiting.  You develop severe pain in your abdomen.  You pass bloody or tarry stool.

## 2024-04-07 NOTE — ED PROVIDER NOTE - OBJECTIVE STATEMENT
53-year-old female with past medical history of breast cancer presenting with diffuse abdominal pain since yesterday morning associated with loose stools and a headache.  Patient reports taking Pepto-Bismol and Tylenol with minimal relief.  No chest pain, shortness of breath, nausea, vomiting, fevers, chills, flank pain, or urinary symptoms.

## 2024-04-07 NOTE — ED ADULT NURSE NOTE - NSFALLUNIVINTERV_ED_ALL_ED
Bed/Stretcher in lowest position, wheels locked, appropriate side rails in place/Call bell, personal items and telephone in reach/Instruct patient to call for assistance before getting out of bed/chair/stretcher/Non-slip footwear applied when patient is off stretcher/[place] to call system/Physically safe environment - no spills, clutter or unnecessary equipment/Purposeful proactive rounding/Room/bathroom lighting operational, light cord in reach

## 2024-04-07 NOTE — ED PROVIDER NOTE - DIFFERENTIAL DIAGNOSIS
50yo  LMP 10/2022 (pre chemotherapy), presents with heavy vaginal bleeding, endometrial thickening Differential Diagnosis

## 2024-04-08 LAB
CULTURE RESULTS: NO GROWTH — SIGNIFICANT CHANGE UP
SPECIMEN SOURCE: SIGNIFICANT CHANGE UP

## 2024-04-19 NOTE — ASU DISCHARGE PLAN (ADULT/PEDIATRIC) - MODE OF TRANSPORTATION
Called and discussed with patient-     She had considered getting nexplanon vs mirena, and states that she was worried following seeing possible side effects of weight gain and other possible things upon researching.  Reviewed with patient that we could proceed with whichever option she would prefer and be more comfortable with.  She asked if we could send in cytotec as discussed with Cesar to help with insertion as she is nervous about the associated discomfort.  Reviewed with patient that Cesar out of office today, but would discuss cytotec with her on Monday and send in if okay and scheduled for insertion for Tuesday. Also advised patient that she could take ibuprofen an hour prior to insertion to help as well.   Patient verified and voiced understanding.    Ambulatory Wheelchair/Stroller

## 2024-05-17 ENCOUNTER — APPOINTMENT (OUTPATIENT)
Dept: GASTROENTEROLOGY | Facility: CLINIC | Age: 54
End: 2024-05-17
Payer: COMMERCIAL

## 2024-05-17 DIAGNOSIS — D64.9 ANEMIA, UNSPECIFIED: ICD-10-CM

## 2024-05-17 DIAGNOSIS — R10.30 LOWER ABDOMINAL PAIN, UNSPECIFIED: ICD-10-CM

## 2024-05-17 PROCEDURE — 99214 OFFICE O/P EST MOD 30 MIN: CPT

## 2024-05-17 NOTE — REVIEW OF SYSTEMS
[As Noted in HPI] : as noted in HPI [Pelvic Pain] : pelvic pain [Negative] : Neurological [Fever] : no fever [Chills] : no chills [Recent Weight Loss (___ Lbs)] : no recent weight loss [Sore Throat] : no sore throat [Hoarseness] : no hoarseness [Chest Pain] : no chest pain [Palpitations] : no palpitations [Cough] : no cough [SOB on Exertion] : no shortness of breath during exertion [Abdominal Pain] : no abdominal pain [Constipation] : no constipation [Heartburn] : no heartburn [Swollen Glands] : no swollen glands [Vomiting] : no vomiting [Diarrhea] : no diarrhea

## 2024-05-17 NOTE — REASON FOR VISIT
[Home] : at home, [unfilled] , at the time of the visit. [Medical Office: ([facility])___] : at the medical office located in  [Patient] : the patient [Follow-up] : a follow-up of an existing diagnosis [FreeTextEntry4] : Bhumi U [FreeTextEntry1] : RPA - 3M F/U

## 2024-05-17 NOTE — ASSESSMENT
[FreeTextEntry1] : 54yo female h/o breast ca s/p lumpectomy on tamoxifen, ovarian cyst, hysterectomy presents for follow up of intermittent nausea and abdominal pain with recent worsening abdominal pain prompting ED evaluation and CT showing gastritis/colitis. Prior EGD/colonoscopy done in 2022.   #Nausea/abdominal pain #Gastritis/colitis #Anemia #Elevated lipase Likely recent self limiting gastroenteritis/colitis, low suspicion for pancreatitis -Update labs including cbc, iron studies/ferritin, lipase (repeat iron studies still not done) -Plan to schedule colonoscopy in 6-8 weeks -Pepcid 20mg qhs/prn -Miralax daily/prn constipation -Encouraged adequate hydration 6-8 glasses water daily -Discussed moderating fiber intake -Antireflux measures including elevating hob and to avoid lying down for 2-3 hours after meals  Follow up 3 months, or sooner as needed Pt agreeable with plan, advised to contact us if questions/concerns in the interim

## 2024-05-17 NOTE — HISTORY OF PRESENT ILLNESS
[Home] : at home, [unfilled] , at the time of the visit. [Medical Office: ([facility])___] : at the medical office located in  [Verbal consent obtained from patient] : the patient, [unfilled] [FreeTextEntry1] : 9/21/22 [de-identified] : 9/7/22: no acute pathology [FreeTextEntry4] : NICK SMITH [de-identified] : 54yo female h/o breast ca s/p lumpectomy on tamoxifen, ovarian cyst, hysterectomy presents for follow up of heartburn and abdominal pain s/p EGD and colonoscopy  EGD performed on 9/21/22 revealing mild chronic gastritis, no evidence of H pylori or celiac disease Colonoscopy revealing internal hemorrhoids, no adenomas identified, fair prep in right colon.  Since last visit, pt went to ED on 4/7/24 with worsening abdominal pain had CT imaging showing gastritis and colitis. States she had eaten at [employer] had popeyes chicken then developed abdominal pain the next day. No diarrhea per pt at that time. Denies etoh use. Pt treated with antibiotics, symptoms resolved. Pt had reported longstanding intermittent LLQ pain and nausea. Previously had CT abd/pelvis which was unremarkable, Seen by gyn ovarian cyst appears to have resolved.  Denies heartburn or epigastric pain. Takes pepcid qhs. No heartburn. States bowel pattern has improved, having regular formed bm daily, no blood in stools. Appetite ok. Denies weight loss. States she has been taking iron intermittently for few years, recently resumed states she was feeling tired. CBC noted, no repeat iron studies.   Reports prior colonoscopy 5-6 years ago findings unclear No known family h/o colon ca  Labs reviewed (4/2024): Hb 11.8, mcv normal LFTs normal Lipase 360 Ferritin 216, %sat 32 (2022)  CT abd/pelvis (4/2024): IMPRESSION: Diffuse colonic wall thickening consistent with colitis. Thickening of the stomach suggesting gastritis

## 2024-07-23 ENCOUNTER — TRANSCRIPTION ENCOUNTER (OUTPATIENT)
Age: 54
End: 2024-07-23

## 2024-07-23 ENCOUNTER — OUTPATIENT (OUTPATIENT)
Dept: OUTPATIENT SERVICES | Facility: HOSPITAL | Age: 54
LOS: 1 days | Discharge: HOME | End: 2024-07-23
Payer: COMMERCIAL

## 2024-07-23 VITALS
WEIGHT: 141.98 LBS | RESPIRATION RATE: 18 BRPM | DIASTOLIC BLOOD PRESSURE: 78 MMHG | TEMPERATURE: 97 F | HEIGHT: 65 IN | SYSTOLIC BLOOD PRESSURE: 165 MMHG | HEART RATE: 82 BPM

## 2024-07-23 VITALS
SYSTOLIC BLOOD PRESSURE: 163 MMHG | DIASTOLIC BLOOD PRESSURE: 72 MMHG | OXYGEN SATURATION: 100 % | HEART RATE: 68 BPM | RESPIRATION RATE: 18 BRPM

## 2024-07-23 DIAGNOSIS — D64.9 ANEMIA, UNSPECIFIED: ICD-10-CM

## 2024-07-23 DIAGNOSIS — Z98.890 OTHER SPECIFIED POSTPROCEDURAL STATES: Chronic | ICD-10-CM

## 2024-07-23 DIAGNOSIS — Z90.711 ACQUIRED ABSENCE OF UTERUS WITH REMAINING CERVICAL STUMP: Chronic | ICD-10-CM

## 2024-07-23 DIAGNOSIS — R10.30 LOWER ABDOMINAL PAIN, UNSPECIFIED: ICD-10-CM

## 2024-07-23 PROCEDURE — 45378 DIAGNOSTIC COLONOSCOPY: CPT

## 2024-07-23 NOTE — ASU PREOP CHECKLIST - IDENTIFICATION BAND VERIFIED
except limited b/l shoulder flexion active ROM, able to flex to ~ 50 degrees in sitting position/bilateral upper extremity ROM was WFL (within functional limits)/bilateral lower extremity ROM was WFL (within functional limits)
done
(0) understands/communicates without difficulty

## 2024-07-23 NOTE — ASU PATIENT PROFILE, ADULT - FALL HARM RISK - PT AGE POPULATION HIDDEN
Stephen Awan   1952, 77 y.o. male   [id number]     26 [address] Awan was seen today, 9/16/2021,  for first follow-up after being fit with right [place] Minium P70-R hearing aid. PROCEDURES:     Patient reported overall benefit with devices; however notes either feedback from aid not being inserted deep enough or discomfort from it being too deep. Otoscopy revealed clear canals, AU. Changed small power to medium power dome. Live speech mapping was completed with a good match to NAL-NL2 targets from 250-8000 Hz, AU. After adjustments patient reported comfort and improved sound quality. Soundfield Testing  Name of test Type of amplification Level of signal Level of noise % Correct % Correct at HAE      Nu-6  Bilateral HAs 65dBHL N/A 56% N/A   SFT results show patient is receiving good benefit from hearing aids and is meeting amplification goals. Reviewed cleaning and maintenance of hearing aids. Patient demonstrated she was able to change wax guards and domes in office. Discussed possibility of custom CONSTANCE molds if retention and feedback continues. He also asked about benefits of upgrading to premium technology. Counseled on improvement and processing with background noise. Patient reported understanding and left with no further questions-will consider upgrade and discuss at next appointment, if desired. Patient Education:       - Verbally and visually reviewed care and maintenance of devices. - Reviewed Hearing Aid Fitting checklist and Troubleshooting document given to patient. Information was shared verbally with patient during appointment. RECOMMENDATIONS:     1. Continue wearing both HAs during all waking hours. 2. Return for follow-up as scheduled. 3. Retest hearing as medically indicated and/or sooner if a change in hearing is noted. 4. Contact audiologist with questions/concerns as needed    No charge for today's appointment.     Chito Conley, AuD  Audiologist Adult

## 2024-07-23 NOTE — ASU DISCHARGE PLAN (ADULT/PEDIATRIC) - CARE PROVIDER_API CALL
Kat Reilly  Gastroenterology  [address] [place] [address]  Phone: [phone]  Fax: [phone]  Follow Up Time:

## 2024-07-23 NOTE — ASU PATIENT PROFILE, ADULT - NSICDXPASTMEDICALHX_GEN_ALL_CORE_FT
PAST MEDICAL HISTORY:  Chronic GERD     DCIS (ductal carcinoma in situ) right    Intermittent hypertension     No pertinent past medical history

## 2024-07-23 NOTE — CHART NOTE - NSCHARTNOTEFT_GEN_A_CORE
PACU ANESTHESIA ADMISSION NOTE      Procedure:   Post op diagnosis:      ____  Intubated  TV:______       Rate: ______      FiO2: ______    __x__  Patent Airway    __x__  Full return of protective reflexes    __x__  Full recovery from anesthesia / back to baseline     Vitals:   T:  37         R:  18                BP:  149/94                Sat:  100                 P: 73      Mental Status:  ___x_ Awake   ___x__ Alert   _____ Drowsy   _____ Sedated    Nausea/Vomiting:  _x___ NO  ______Yes,   See Post - Op Orders          Pain Scale (0-10):  __0___    Treatment: ____ None    ____ See Post - Op/PCA Orders    Post - Operative Fluids:   _x___ Oral   ____ See Post - Op Orders    Plan: Discharge:   x____Home       _____Floor     _____Critical Care    _____  Other:_________________    Comments:

## 2024-07-23 NOTE — ASU PREOP CHECKLIST - NSWEIGHTCALCTOOLDRUG_GEN_A_CORE
used Pt reports abdominal discomfort as he has not been able to void since 0600 today, states he also feels constipated as he is on pain medications.

## 2024-07-26 ENCOUNTER — RX RENEWAL (OUTPATIENT)
Age: 54
End: 2024-07-26

## 2024-07-26 RX ORDER — FAMOTIDINE 20 MG/1
20 TABLET, FILM COATED ORAL
Qty: 30 | Refills: 1 | Status: ACTIVE | COMMUNITY
Start: 2024-07-26 | End: 1900-01-01

## 2024-07-29 DIAGNOSIS — K21.9 GASTRO-ESOPHAGEAL REFLUX DISEASE WITHOUT ESOPHAGITIS: ICD-10-CM

## 2024-07-29 DIAGNOSIS — R93.3 ABNORMAL FINDINGS ON DIAGNOSTIC IMAGING OF OTHER PARTS OF DIGESTIVE TRACT: ICD-10-CM

## 2024-07-29 DIAGNOSIS — K64.4 RESIDUAL HEMORRHOIDAL SKIN TAGS: ICD-10-CM

## 2024-07-29 DIAGNOSIS — Z85.3 PERSONAL HISTORY OF MALIGNANT NEOPLASM OF BREAST: ICD-10-CM

## 2024-07-29 DIAGNOSIS — I10 ESSENTIAL (PRIMARY) HYPERTENSION: ICD-10-CM

## 2024-08-14 ENCOUNTER — RX RENEWAL (OUTPATIENT)
Age: 54
End: 2024-08-14

## 2024-08-23 ENCOUNTER — APPOINTMENT (OUTPATIENT)
Dept: GASTROENTEROLOGY | Facility: CLINIC | Age: 54
End: 2024-08-23
Payer: COMMERCIAL

## 2024-08-23 DIAGNOSIS — K59.00 CONSTIPATION, UNSPECIFIED: ICD-10-CM

## 2024-08-23 DIAGNOSIS — D64.9 ANEMIA, UNSPECIFIED: ICD-10-CM

## 2024-08-23 DIAGNOSIS — R10.32 LEFT LOWER QUADRANT PAIN: ICD-10-CM

## 2024-08-23 PROCEDURE — 99214 OFFICE O/P EST MOD 30 MIN: CPT

## 2024-08-23 NOTE — HISTORY OF PRESENT ILLNESS
[Home] : at home, [unfilled] , at the time of the visit. [Medical Office: ([facility])___] : at the medical office located in  [Verbal consent obtained from patient] : the patient, [unfilled] [FreeTextEntry4] : NICK SMITH [FreeTextEntry1] : 9/21/22 [de-identified] : 9/7/22: no acute pathology [de-identified] : 53yo female h/o breast ca s/p lumpectomy on tamoxifen, ovarian cyst, hysterectomy presents for follow up of heartburn and abdominal pain s/p EGD and colonoscopy  EGD performed on 9/21/22 revealing mild chronic gastritis, no evidence of H pylori or celiac disease Colonoscopy revealing internal hemorrhoids, no adenomas identified, fair prep in right colon. Repeat colonoscopy 7/23/24 revealing ext hemorrhoids, fair prep in right colon otherwise adequate visualization obtained.   Pt reports recent URI symptoms diagnosed with covid, gradually improving. Notes intermittent bloating and constipation, takes miralax when needed. Occasional heartburn, takes pepcid with good response. Denies n/v or dysphagia. Pt had worsening abd pain as previously noted, pt went to ED on 4/7/24 with worsening abdominal pain had CT imaging showing gastritis and colitis. States she had eaten at [employer] had popeyes chicken then developed abdominal pain the next day. No diarrhea per pt at that time. Symptoms resolved, now at baseline.  Appetite ok. Denies weight loss. States she has been taking iron intermittently for few years, recently resumed states she was feeling tired takinge every other day. Pending repeat labs, states she had done at [employer].  No known family h/o colon ca  Labs reviewed (4/2024): Hb 11.8, mcv normal LFTs normal Lipase 360 Ferritin 216, %sat 32 (2022)  CT abd/pelvis (4/2024): IMPRESSION: Diffuse colonic wall thickening consistent with colitis. Thickening of the stomach suggesting gastritis

## 2024-08-23 NOTE — REASON FOR VISIT
[Follow-up] : a follow-up of an existing diagnosis [Home] : at home, [unfilled] , at the time of the visit. [Medical Office: ([facility])___] : at the medical office located in  [Patient] : the patient [FreeTextEntry1] : RPA POST PROCEDURE [FreeTextEntry4] : Bhumi U

## 2024-08-23 NOTE — REASON FOR VISIT
[Follow-up] : a follow-up of an existing diagnosis [Home] : at home, [unfilled] , at the time of the visit. [Medical Office: ([facility])___] : at the medical office located in  [Patient] : the patient [FreeTextEntry1] : RPA POST PROCEDURE [FreeTextEntry4] : Bhumi U

## 2024-08-23 NOTE — ASSESSMENT
[FreeTextEntry1] : 55yo female h/o breast ca s/p lumpectomy on tamoxifen, ovarian cyst, hysterectomy presents for follow up of intermittent nausea and abdominal pain with recent worsening abdominal pain prompting ED evaluation and CT showing gastritis/colitis.   #Nausea/abdominal pain #Gastritis/colitis #Anemia #Elevated lipase Likely recent self limiting gastroenteritis/colitis, low suspicion for pancreatitis EGD performed on 9/21/22 revealing mild chronic gastritis, no evidence of H pylori or celiac disease Colonoscopy revealing internal hemorrhoids, no adenomas identified, fair prep in right colon. Repeat colonoscopy 7/23/24 revealing ext hemorrhoids, fair prep in right colon otherwise adequate visualization obtained.  -Obtain recent labs states done at [facility] -Pepcid 20mg qhs/prn -Miralax daily/prn constipation -Encouraged adequate hydration 6-8 glasses water daily -Discussed moderating fiber intake -Antireflux measures including elevating hob and to avoid lying down for 2-3 hours after meals -Repeat colonoscopy in 5 years sooner based on symptoms -Advised PMD follow up for covid symptoms  Follow up 3 months, or sooner as needed Pt agreeable with plan, advised to contact us if questions/concerns in the interim

## 2024-08-23 NOTE — HISTORY OF PRESENT ILLNESS
[Home] : at home, [unfilled] , at the time of the visit. [Medical Office: ([facility])___] : at the medical office located in  [Verbal consent obtained from patient] : the patient, [unfilled] [FreeTextEntry4] : NICK SMITH [FreeTextEntry1] : 9/21/22 [de-identified] : 9/7/22: no acute pathology [de-identified] : 53yo female h/o breast ca s/p lumpectomy on tamoxifen, ovarian cyst, hysterectomy presents for follow up of heartburn and abdominal pain s/p EGD and colonoscopy  EGD performed on 9/21/22 revealing mild chronic gastritis, no evidence of H pylori or celiac disease Colonoscopy revealing internal hemorrhoids, no adenomas identified, fair prep in right colon. Repeat colonoscopy 7/23/24 revealing ext hemorrhoids, fair prep in right colon otherwise adequate visualization obtained.   Pt reports recent URI symptoms diagnosed with covid, gradually improving. Notes intermittent bloating and constipation, takes miralax when needed. Occasional heartburn, takes pepcid with good response. Denies n/v or dysphagia. Pt had worsening abd pain as previously noted, pt went to ED on 4/7/24 with worsening abdominal pain had CT imaging showing gastritis and colitis. States she had eaten at [employer] had popeyes chicken then developed abdominal pain the next day. No diarrhea per pt at that time. Symptoms resolved, now at baseline.  Appetite ok. Denies weight loss. States she has been taking iron intermittently for few years, recently resumed states she was feeling tired takinge every other day. Pending repeat labs, states she had done at [employer].  No known family h/o colon ca  Labs reviewed (4/2024): Hb 11.8, mcv normal LFTs normal Lipase 360 Ferritin 216, %sat 32 (2022)  CT abd/pelvis (4/2024): IMPRESSION: Diffuse colonic wall thickening consistent with colitis. Thickening of the stomach suggesting gastritis

## 2024-08-23 NOTE — ASSESSMENT
[FreeTextEntry1] : 55yo female h/o breast ca s/p lumpectomy on tamoxifen, ovarian cyst, hysterectomy presents for follow up of intermittent nausea and abdominal pain with recent worsening abdominal pain prompting ED evaluation and CT showing gastritis/colitis.   #Nausea/abdominal pain #Gastritis/colitis #Anemia #Elevated lipase Likely recent self limiting gastroenteritis/colitis, low suspicion for pancreatitis EGD performed on 9/21/22 revealing mild chronic gastritis, no evidence of H pylori or celiac disease Colonoscopy revealing internal hemorrhoids, no adenomas identified, fair prep in right colon. Repeat colonoscopy 7/23/24 revealing ext hemorrhoids, fair prep in right colon otherwise adequate visualization obtained.  -Obtain recent labs states done at [facility] -Pepcid 20mg qhs/prn -Miralax daily/prn constipation -Encouraged adequate hydration 6-8 glasses water daily -Discussed moderating fiber intake -Antireflux measures including elevating hob and to avoid lying down for 2-3 hours after meals -Repeat colonoscopy in 5 years sooner based on symptoms -Advised PMD follow up for covid symptoms  Follow up 3 months, or sooner as needed Pt agreeable with plan, advised to contact us if questions/concerns in the interim

## 2024-08-26 ENCOUNTER — APPOINTMENT (OUTPATIENT)
Age: 54
End: 2024-08-26
Payer: COMMERCIAL

## 2024-08-26 ENCOUNTER — OUTPATIENT (OUTPATIENT)
Dept: OUTPATIENT SERVICES | Facility: HOSPITAL | Age: 54
LOS: 1 days | End: 2024-08-26
Payer: COMMERCIAL

## 2024-08-26 VITALS
SYSTOLIC BLOOD PRESSURE: 150 MMHG | OXYGEN SATURATION: 100 % | TEMPERATURE: 98 F | BODY MASS INDEX: 23.82 KG/M2 | RESPIRATION RATE: 17 BRPM | DIASTOLIC BLOOD PRESSURE: 89 MMHG | HEART RATE: 100 BPM | HEIGHT: 65 IN | WEIGHT: 143 LBS

## 2024-08-26 DIAGNOSIS — D64.9 ANEMIA, UNSPECIFIED: ICD-10-CM

## 2024-08-26 DIAGNOSIS — Z90.711 ACQUIRED ABSENCE OF UTERUS WITH REMAINING CERVICAL STUMP: Chronic | ICD-10-CM

## 2024-08-26 DIAGNOSIS — Z98.890 OTHER SPECIFIED POSTPROCEDURAL STATES: Chronic | ICD-10-CM

## 2024-08-26 DIAGNOSIS — Z79.810 ENCOUNTER FOR THERAPEUTIC DRUG LVL MONITORING: ICD-10-CM

## 2024-08-26 DIAGNOSIS — Z51.81 ENCOUNTER FOR THERAPEUTIC DRUG LVL MONITORING: ICD-10-CM

## 2024-08-26 DIAGNOSIS — D05.11 INTRADUCTAL CARCINOMA IN SITU OF RIGHT BREAST: ICD-10-CM

## 2024-08-26 PROCEDURE — 99214 OFFICE O/P EST MOD 30 MIN: CPT

## 2024-08-26 PROCEDURE — G2211 COMPLEX E/M VISIT ADD ON: CPT | Mod: NC

## 2024-08-26 NOTE — PHYSICAL EXAM
[Fully active, able to carry on all pre-disease performance without restriction] : Status 0 - Fully active, able to carry on all pre-disease performance without restriction [Normal] : bilateral breasts without nipple retraction, skin dimpling or palpable masses; the bilateral axillae are without adenopathy [de-identified] : surgical incision on right breast healed well; bilateral breast exam neg [de-identified] : No tenderness, erythema on Rt foot. No pain on deep palpation

## 2024-08-26 NOTE — ASSESSMENT
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 She is s/p breast conserving surgery and adjuvant RT.   Pt became menopausal after hysterectomy for fibroids. Ovaries were not removed.  We had a detailed discussion regarding her diagnosis and therapeutic options.  The natural history and prognosis of DCIS was discussed at length. She has already completed local treatment without any serious complications. She was recommended adjuvant endocrine therapy with Tamoxifen. She was started on Tamoxifen 20mg. She was apprised of the data on low dose Gas[place]. She went on the lower dose  RECOMMENDATIONS:  Previous notes reviewed and all relevant radiology results discussed with and were communicated to the patients.  -- Change  Tamoxifen 5mg daily,  to continue till 11/24) Side effects of Tamoxifen including hot flashes, menstrual irregularities, weight gain, mood changes, DVT, cataracts  were discussed.  --  mammogram, in 9/23 was BENIGN, next mammo due in 9/24 -- Encouraged a healthy life style with regular exercise, healthy diet.   UTD with Colonoscopy in 7/24 All her concerns were addressed during the visit. RT in 6 months.

## 2024-08-26 NOTE — HISTORY OF PRESENT ILLNESS
[Disease: _____________________] : Disease: [unfilled] [T: ___] : T[unfilled] [N: ___] : N[unfilled] [M: ___] : M[unfilled] [AJCC Stage: ____] : AJCC Stage: [unfilled] [de-identified] : This is a very pleasant 51 year old woman who is being eval for Rt breast DCIS\par  \par  Work up so far\par  screening mammogram (4/13/2021) :  calcifications in the right breast at 12 o'clock. Diagnostic mammogram and ultrasound on 4/16/2021 showed indeterminate calcifications in the right breast. Stereotactically guided core biopsy recommended. 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 It was ER positive / DC negative.\par  On 6/4/2021, two (2) outside slide for review from [facility] shows ductal carcinoma in-situ (DCIS), solid type with single cell necrosis and calcifications, intermediate nuclear grade. It was ER positive and DC negative. \par  \par  She also had an MRI of bilateral breasts on 6/17/2021, which showed indeterminate areas of abnormal enhancement in each breast. MRI guided biopsy of both breasts (1 site in each breast) is recommended.\par  Biopsy-proven DCIS in the right breast.\par  Recommendation: MRI guided biopsy.\par  BI-RADS Category 4: Suspicious\par  \par  On 6/22/2021, bilateral stereotactic biopsy was performed and pathology was benign in both breasts. \par  \par  On 7/14/2021, She underwent a wide local excision, right breast at 12 o'clock, superior middle 1/3. She was found to have DCIS, G2, ER positive / DC negative. Surgical margins were very close: 0.2mm. She had a re-excision on 8/13/2021 and there was no residual disease. \par  \par  Prelude Dx (7/14/2021) DCISonRT score results shows 3 (0-10 scale).\par  \par  She has completed RT on 11/1/21\par  \par  \par  She has been doing well since surgery.    Patient denies any new palpable breast lumps or pain, denies skin changes, denies nipple discharge.  Patient denies cough, shortness of breath, denies fever, denies bone pain.\par  \par  Menstrual Hx: The patient is in surgical menopause. age at menarche was 16. age at menopause was 45. \par  Past Gyn Surgeries: partial hysterectomy 2016. \par  Prior pregnancies: G3 and P3 . Age at live birth: 19 \par  Birth Control Pill Use: yes, stopped in 1997. \par  Breast Feeding History: yes for 6 months \par  \par  \par   [de-identified] : DCIS [de-identified] : 8/16/22 Patient is here to follow up for breast cancer. She is on Tamoxifen 20 mg daily since 11/2021, compliant and tolerating well. She denies any breast mass/pain, skin changes or nipple discharge. She has appt with GI on Friday, 8/1/22.  3/6/23 Pt is here for follow up. Compliant with Tamoxifen, has hot flashes off and on. Had colonoscopy last year  9/18/23  Patient here for follow up, feeling well.  She denies any new complaints.  Patient denies any new palpable breast lumps or pain, denies skin changes, denies nipple discharge.  Patient denies cough, shortness of breath, denies fever, denies bone pain. compiant with [facility] 3/4/24 Pt is here for follow up. Feels well. No breast related complaints. Has Rt foot pain on walking. 8/26/24 Pt is here for follow up. Has been taking Tamoxifen 10mg daily instead of 5mg daily. C/o headaches sec to high BP, saw her PCP, likely will need antiHTN meds. No breast related complaints. Getting hotflashes

## 2024-08-27 DIAGNOSIS — D64.9 ANEMIA, UNSPECIFIED: ICD-10-CM

## 2024-08-27 PROBLEM — K21.9 GASTRO-ESOPHAGEAL REFLUX DISEASE WITHOUT ESOPHAGITIS: Chronic | Status: ACTIVE | Noted: 2024-07-23

## 2024-09-30 ENCOUNTER — OUTPATIENT (OUTPATIENT)
Dept: OUTPATIENT SERVICES | Facility: HOSPITAL | Age: 54
LOS: 1 days | End: 2024-09-30
Payer: COMMERCIAL

## 2024-09-30 DIAGNOSIS — Z12.31 ENCOUNTER FOR SCREENING MAMMOGRAM FOR MALIGNANT NEOPLASM OF BREAST: ICD-10-CM

## 2024-09-30 DIAGNOSIS — Z90.711 ACQUIRED ABSENCE OF UTERUS WITH REMAINING CERVICAL STUMP: Chronic | ICD-10-CM

## 2024-09-30 DIAGNOSIS — Z98.890 OTHER SPECIFIED POSTPROCEDURAL STATES: Chronic | ICD-10-CM

## 2024-09-30 PROCEDURE — 77067 SCR MAMMO BI INCL CAD: CPT | Mod: 26

## 2024-09-30 PROCEDURE — 77067 SCR MAMMO BI INCL CAD: CPT

## 2024-09-30 PROCEDURE — 77063 BREAST TOMOSYNTHESIS BI: CPT | Mod: 26

## 2024-09-30 PROCEDURE — 77063 BREAST TOMOSYNTHESIS BI: CPT

## 2024-10-01 DIAGNOSIS — Z12.31 ENCOUNTER FOR SCREENING MAMMOGRAM FOR MALIGNANT NEOPLASM OF BREAST: ICD-10-CM

## 2024-11-05 ENCOUNTER — RX RENEWAL (OUTPATIENT)
Age: 54
End: 2024-11-05

## 2024-12-12 ENCOUNTER — NON-APPOINTMENT (OUTPATIENT)
Age: 54
End: 2024-12-12

## 2024-12-12 ENCOUNTER — APPOINTMENT (OUTPATIENT)
Dept: OBGYN | Facility: CLINIC | Age: 54
End: 2024-12-12
Payer: COMMERCIAL

## 2024-12-12 VITALS — WEIGHT: 142 LBS | BODY MASS INDEX: 23.66 KG/M2 | HEIGHT: 65 IN

## 2024-12-12 DIAGNOSIS — Z01.419 ENCOUNTER FOR GYNECOLOGICAL EXAMINATION (GENERAL) (ROUTINE) W/OUT ABNORMAL FINDINGS: ICD-10-CM

## 2024-12-12 PROCEDURE — 99396 PREV VISIT EST AGE 40-64: CPT

## 2024-12-15 LAB — BACTERIA UR CULT: NORMAL

## 2024-12-16 LAB — HPV HIGH+LOW RISK DNA PNL CVX: NOT DETECTED

## 2024-12-17 LAB
BV BACTERIA RRNA VAG QL NAA+PROBE: NOT DETECTED
C GLABRATA RNA VAG QL NAA+PROBE: NOT DETECTED
C TRACH RRNA SPEC QL NAA+PROBE: NOT DETECTED
CANDIDA RRNA VAG QL PROBE: NOT DETECTED
N GONORRHOEA RRNA SPEC QL NAA+PROBE: NOT DETECTED
T VAGINALIS RRNA SPEC QL NAA+PROBE: NOT DETECTED

## 2024-12-19 LAB — CYTOLOGY CVX/VAG DOC THIN PREP: NORMAL

## 2025-02-10 NOTE — PHYSICAL EXAM
[Appropriately responsive] : appropriately responsive [Alert] : alert [No Acute Distress] : no acute distress [No Lymphadenopathy] : no lymphadenopathy [Regular Rate Rhythm] : regular rate rhythm [No Murmurs] : no murmurs [Clear to Auscultation B/L] : clear to auscultation bilaterally [Soft] : soft [Non-tender] : non-tender [Non-distended] : non-distended [No HSM] : No HSM [No Lesions] : no lesions [No Mass] : no mass [Oriented x3] : oriented x3 [Examination Of The Breasts] : a normal appearance [Normal] : normal [No Masses] : no breast masses were palpable [Absent] : absent Yes

## 2025-02-24 ENCOUNTER — OUTPATIENT (OUTPATIENT)
Dept: OUTPATIENT SERVICES | Facility: HOSPITAL | Age: 55
LOS: 1 days | End: 2025-02-24
Payer: COMMERCIAL

## 2025-02-24 ENCOUNTER — APPOINTMENT (OUTPATIENT)
Age: 55
End: 2025-02-24
Payer: COMMERCIAL

## 2025-02-24 VITALS
RESPIRATION RATE: 17 BRPM | TEMPERATURE: 98.1 F | SYSTOLIC BLOOD PRESSURE: 131 MMHG | WEIGHT: 150 LBS | BODY MASS INDEX: 24.99 KG/M2 | HEIGHT: 65 IN | HEART RATE: 99 BPM | OXYGEN SATURATION: 97 % | DIASTOLIC BLOOD PRESSURE: 80 MMHG

## 2025-02-24 DIAGNOSIS — D05.11 INTRADUCTAL CARCINOMA IN SITU OF RIGHT BREAST: ICD-10-CM

## 2025-02-24 DIAGNOSIS — Z98.890 OTHER SPECIFIED POSTPROCEDURAL STATES: Chronic | ICD-10-CM

## 2025-02-24 DIAGNOSIS — Z90.711 ACQUIRED ABSENCE OF UTERUS WITH REMAINING CERVICAL STUMP: Chronic | ICD-10-CM

## 2025-02-24 DIAGNOSIS — Z79.810 ENCOUNTER FOR THERAPEUTIC DRUG LVL MONITORING: ICD-10-CM

## 2025-02-24 DIAGNOSIS — Z51.81 ENCOUNTER FOR THERAPEUTIC DRUG LVL MONITORING: ICD-10-CM

## 2025-02-24 PROCEDURE — 99213 OFFICE O/P EST LOW 20 MIN: CPT

## 2025-02-24 RX ORDER — VERAPAMIL HYDROCHLORIDE 120 MG/1
120 TABLET ORAL
Refills: 0 | Status: ACTIVE | COMMUNITY

## 2025-02-24 RX ORDER — HYDROCHLOROTHIAZIDE 12.5 MG/1
12.5 TABLET ORAL
Refills: 0 | Status: ACTIVE | COMMUNITY

## 2025-02-25 DIAGNOSIS — D05.11 INTRADUCTAL CARCINOMA IN SITU OF RIGHT BREAST: ICD-10-CM

## 2025-06-17 ENCOUNTER — OUTPATIENT (OUTPATIENT)
Dept: OUTPATIENT SERVICES | Facility: HOSPITAL | Age: 55
LOS: 1 days | End: 2025-06-17
Payer: COMMERCIAL

## 2025-06-17 ENCOUNTER — APPOINTMENT (OUTPATIENT)
Age: 55
End: 2025-06-17
Payer: COMMERCIAL

## 2025-06-17 VITALS
HEIGHT: 65 IN | HEART RATE: 102 BPM | BODY MASS INDEX: 24.66 KG/M2 | DIASTOLIC BLOOD PRESSURE: 86 MMHG | TEMPERATURE: 97.9 F | RESPIRATION RATE: 16 BRPM | WEIGHT: 148 LBS | SYSTOLIC BLOOD PRESSURE: 135 MMHG

## 2025-06-17 DIAGNOSIS — D05.11 INTRADUCTAL CARCINOMA IN SITU OF RIGHT BREAST: ICD-10-CM

## 2025-06-17 DIAGNOSIS — Z98.890 OTHER SPECIFIED POSTPROCEDURAL STATES: Chronic | ICD-10-CM

## 2025-06-17 DIAGNOSIS — Z90.711 ACQUIRED ABSENCE OF UTERUS WITH REMAINING CERVICAL STUMP: Chronic | ICD-10-CM

## 2025-06-17 PROCEDURE — 99213 OFFICE O/P EST LOW 20 MIN: CPT

## 2025-06-18 DIAGNOSIS — D05.11 INTRADUCTAL CARCINOMA IN SITU OF RIGHT BREAST: ICD-10-CM
